# Patient Record
Sex: FEMALE | Race: WHITE | HISPANIC OR LATINO | Employment: UNEMPLOYED | URBAN - METROPOLITAN AREA
[De-identification: names, ages, dates, MRNs, and addresses within clinical notes are randomized per-mention and may not be internally consistent; named-entity substitution may affect disease eponyms.]

---

## 2022-08-04 ENCOUNTER — APPOINTMENT (OUTPATIENT)
Dept: RADIOLOGY | Facility: MEDICAL CENTER | Age: 62
DRG: 418 | End: 2022-08-04
Attending: EMERGENCY MEDICINE
Payer: MEDICAID

## 2022-08-04 ENCOUNTER — HOSPITAL ENCOUNTER (INPATIENT)
Facility: MEDICAL CENTER | Age: 62
LOS: 4 days | DRG: 418 | End: 2022-08-08
Attending: EMERGENCY MEDICINE | Admitting: INTERNAL MEDICINE
Payer: MEDICAID

## 2022-08-04 DIAGNOSIS — D72.829 LEUKOCYTOSIS, UNSPECIFIED TYPE: ICD-10-CM

## 2022-08-04 DIAGNOSIS — R17 SERUM TOTAL BILIRUBIN ELEVATED: ICD-10-CM

## 2022-08-04 DIAGNOSIS — R10.13 EPIGASTRIC PAIN: ICD-10-CM

## 2022-08-04 DIAGNOSIS — R79.89 ELEVATED LFTS: ICD-10-CM

## 2022-08-04 PROBLEM — K83.1 BILE OBSTRUCTION: Status: ACTIVE | Noted: 2022-08-04

## 2022-08-04 PROBLEM — R74.8 ELEVATED LIVER ENZYMES: Status: ACTIVE | Noted: 2022-08-04

## 2022-08-04 LAB
ALBUMIN SERPL BCP-MCNC: 4.2 G/DL (ref 3.2–4.9)
ALBUMIN/GLOB SERPL: 1.3 G/DL
ALP SERPL-CCNC: 352 U/L (ref 30–99)
ALT SERPL-CCNC: 758 U/L (ref 2–50)
ANION GAP SERPL CALC-SCNC: 16 MMOL/L (ref 7–16)
APPEARANCE UR: CLEAR
AST SERPL-CCNC: 640 U/L (ref 12–45)
BACTERIA #/AREA URNS HPF: NEGATIVE /HPF
BASOPHILS # BLD AUTO: 0.2 % (ref 0–1.8)
BASOPHILS # BLD: 0.02 K/UL (ref 0–0.12)
BILIRUB SERPL-MCNC: 6.4 MG/DL (ref 0.1–1.5)
BILIRUB UR QL STRIP.AUTO: ABNORMAL
BUN SERPL-MCNC: 18 MG/DL (ref 8–22)
CALCIUM SERPL-MCNC: 9.2 MG/DL (ref 8.5–10.5)
CHLORIDE SERPL-SCNC: 102 MMOL/L (ref 96–112)
CO2 SERPL-SCNC: 18 MMOL/L (ref 20–33)
COLOR UR: ABNORMAL
CREAT SERPL-MCNC: 0.6 MG/DL (ref 0.5–1.4)
EOSINOPHIL # BLD AUTO: 0 K/UL (ref 0–0.51)
EOSINOPHIL NFR BLD: 0 % (ref 0–6.9)
EPI CELLS #/AREA URNS HPF: ABNORMAL /HPF
ERYTHROCYTE [DISTWIDTH] IN BLOOD BY AUTOMATED COUNT: 46 FL (ref 35.9–50)
GFR SERPLBLD CREATININE-BSD FMLA CKD-EPI: 100 ML/MIN/1.73 M 2
GLOBULIN SER CALC-MCNC: 3.2 G/DL (ref 1.9–3.5)
GLUCOSE SERPL-MCNC: 146 MG/DL (ref 65–99)
GLUCOSE UR STRIP.AUTO-MCNC: NEGATIVE MG/DL
HAV IGM SERPL QL IA: NORMAL
HBV CORE IGM SER QL: NORMAL
HBV SURFACE AG SER QL: NORMAL
HCT VFR BLD AUTO: 42.9 % (ref 37–47)
HCV AB SER QL: NORMAL
HGB BLD-MCNC: 14.5 G/DL (ref 12–16)
IMM GRANULOCYTES # BLD AUTO: 0.07 K/UL (ref 0–0.11)
IMM GRANULOCYTES NFR BLD AUTO: 0.6 % (ref 0–0.9)
KETONES UR STRIP.AUTO-MCNC: 80 MG/DL
LEUKOCYTE ESTERASE UR QL STRIP.AUTO: ABNORMAL
LIPASE SERPL-CCNC: 33 U/L (ref 11–82)
LYMPHOCYTES # BLD AUTO: 0.41 K/UL (ref 1–4.8)
LYMPHOCYTES NFR BLD: 3.4 % (ref 22–41)
MCH RBC QN AUTO: 29.7 PG (ref 27–33)
MCHC RBC AUTO-ENTMCNC: 33.8 G/DL (ref 33.6–35)
MCV RBC AUTO: 87.7 FL (ref 81.4–97.8)
MICRO URNS: ABNORMAL
MONOCYTES # BLD AUTO: 0.33 K/UL (ref 0–0.85)
MONOCYTES NFR BLD AUTO: 2.7 % (ref 0–13.4)
NEUTROPHILS # BLD AUTO: 11.2 K/UL (ref 2–7.15)
NEUTROPHILS NFR BLD: 93.1 % (ref 44–72)
NITRITE UR QL STRIP.AUTO: NEGATIVE
NRBC # BLD AUTO: 0 K/UL
NRBC BLD-RTO: 0 /100 WBC
PH UR STRIP.AUTO: 6.5 [PH] (ref 5–8)
PLATELET # BLD AUTO: 263 K/UL (ref 164–446)
PMV BLD AUTO: 9.3 FL (ref 9–12.9)
POTASSIUM SERPL-SCNC: 3.9 MMOL/L (ref 3.6–5.5)
PROT SERPL-MCNC: 7.4 G/DL (ref 6–8.2)
PROT UR QL STRIP: 30 MG/DL
RBC # BLD AUTO: 4.89 M/UL (ref 4.2–5.4)
RBC # URNS HPF: ABNORMAL /HPF
RBC UR QL AUTO: ABNORMAL
SODIUM SERPL-SCNC: 136 MMOL/L (ref 135–145)
SP GR UR STRIP.AUTO: 1.02
UROBILINOGEN UR STRIP.AUTO-MCNC: 4 MG/DL
WBC # BLD AUTO: 12 K/UL (ref 4.8–10.8)
WBC #/AREA URNS HPF: ABNORMAL /HPF

## 2022-08-04 PROCEDURE — 80074 ACUTE HEPATITIS PANEL: CPT

## 2022-08-04 PROCEDURE — 76705 ECHO EXAM OF ABDOMEN: CPT

## 2022-08-04 PROCEDURE — 96365 THER/PROPH/DIAG IV INF INIT: CPT

## 2022-08-04 PROCEDURE — 99285 EMERGENCY DEPT VISIT HI MDM: CPT

## 2022-08-04 PROCEDURE — 96375 TX/PRO/DX INJ NEW DRUG ADDON: CPT

## 2022-08-04 PROCEDURE — 700102 HCHG RX REV CODE 250 W/ 637 OVERRIDE(OP): Performed by: INTERNAL MEDICINE

## 2022-08-04 PROCEDURE — 80053 COMPREHEN METABOLIC PANEL: CPT

## 2022-08-04 PROCEDURE — 86038 ANTINUCLEAR ANTIBODIES: CPT

## 2022-08-04 PROCEDURE — 700111 HCHG RX REV CODE 636 W/ 250 OVERRIDE (IP): Performed by: INTERNAL MEDICINE

## 2022-08-04 PROCEDURE — 700105 HCHG RX REV CODE 258: Performed by: INTERNAL MEDICINE

## 2022-08-04 PROCEDURE — 81001 URINALYSIS AUTO W/SCOPE: CPT

## 2022-08-04 PROCEDURE — 770001 HCHG ROOM/CARE - MED/SURG/GYN PRIV*

## 2022-08-04 PROCEDURE — 96372 THER/PROPH/DIAG INJ SC/IM: CPT

## 2022-08-04 PROCEDURE — A9270 NON-COVERED ITEM OR SERVICE: HCPCS | Performed by: INTERNAL MEDICINE

## 2022-08-04 PROCEDURE — 700111 HCHG RX REV CODE 636 W/ 250 OVERRIDE (IP): Performed by: EMERGENCY MEDICINE

## 2022-08-04 PROCEDURE — 36415 COLL VENOUS BLD VENIPUNCTURE: CPT

## 2022-08-04 PROCEDURE — 85025 COMPLETE CBC W/AUTO DIFF WBC: CPT

## 2022-08-04 PROCEDURE — 83690 ASSAY OF LIPASE: CPT

## 2022-08-04 PROCEDURE — 99223 1ST HOSP IP/OBS HIGH 75: CPT | Performed by: INTERNAL MEDICINE

## 2022-08-04 PROCEDURE — 700101 HCHG RX REV CODE 250: Performed by: INTERNAL MEDICINE

## 2022-08-04 PROCEDURE — 700101 HCHG RX REV CODE 250: Performed by: EMERGENCY MEDICINE

## 2022-08-04 RX ORDER — ONDANSETRON 4 MG/1
4 TABLET, ORALLY DISINTEGRATING ORAL EVERY 4 HOURS PRN
Status: DISCONTINUED | OUTPATIENT
Start: 2022-08-04 | End: 2022-08-08 | Stop reason: HOSPADM

## 2022-08-04 RX ORDER — METRONIDAZOLE 500 MG/100ML
500 INJECTION, SOLUTION INTRAVENOUS EVERY 6 HOURS
Status: COMPLETED | OUTPATIENT
Start: 2022-08-04 | End: 2022-08-04

## 2022-08-04 RX ORDER — SODIUM CHLORIDE 9 MG/ML
INJECTION, SOLUTION INTRAVENOUS CONTINUOUS
Status: DISCONTINUED | OUTPATIENT
Start: 2022-08-04 | End: 2022-08-06

## 2022-08-04 RX ORDER — PROMETHAZINE HYDROCHLORIDE 12.5 MG/1
12.5-25 SUPPOSITORY RECTAL EVERY 4 HOURS PRN
Status: DISCONTINUED | OUTPATIENT
Start: 2022-08-04 | End: 2022-08-08 | Stop reason: HOSPADM

## 2022-08-04 RX ORDER — IBUPROFEN 200 MG
400 TABLET ORAL EVERY 6 HOURS PRN
Status: ON HOLD | COMMUNITY
End: 2022-08-08

## 2022-08-04 RX ORDER — PROMETHAZINE HYDROCHLORIDE 25 MG/1
12.5-25 TABLET ORAL EVERY 4 HOURS PRN
Status: DISCONTINUED | OUTPATIENT
Start: 2022-08-04 | End: 2022-08-08 | Stop reason: HOSPADM

## 2022-08-04 RX ORDER — AMOXICILLIN 250 MG
2 CAPSULE ORAL 2 TIMES DAILY
Status: DISCONTINUED | OUTPATIENT
Start: 2022-08-05 | End: 2022-08-08 | Stop reason: HOSPADM

## 2022-08-04 RX ORDER — ACETAMINOPHEN 325 MG/1
650 TABLET ORAL EVERY 6 HOURS PRN
Status: DISCONTINUED | OUTPATIENT
Start: 2022-08-04 | End: 2022-08-08 | Stop reason: HOSPADM

## 2022-08-04 RX ORDER — CEFTRIAXONE 1 G/1
1 INJECTION, POWDER, FOR SOLUTION INTRAMUSCULAR; INTRAVENOUS ONCE
Status: COMPLETED | OUTPATIENT
Start: 2022-08-04 | End: 2022-08-04

## 2022-08-04 RX ORDER — POLYETHYLENE GLYCOL 3350 17 G/17G
1 POWDER, FOR SOLUTION ORAL
Status: DISCONTINUED | OUTPATIENT
Start: 2022-08-04 | End: 2022-08-08 | Stop reason: HOSPADM

## 2022-08-04 RX ORDER — BISACODYL 10 MG
10 SUPPOSITORY, RECTAL RECTAL
Status: DISCONTINUED | OUTPATIENT
Start: 2022-08-04 | End: 2022-08-08 | Stop reason: HOSPADM

## 2022-08-04 RX ORDER — ONDANSETRON 2 MG/ML
4 INJECTION INTRAMUSCULAR; INTRAVENOUS EVERY 4 HOURS PRN
Status: DISCONTINUED | OUTPATIENT
Start: 2022-08-04 | End: 2022-08-08 | Stop reason: HOSPADM

## 2022-08-04 RX ORDER — METRONIDAZOLE 500 MG/100ML
500 INJECTION, SOLUTION INTRAVENOUS EVERY 8 HOURS
Status: DISCONTINUED | OUTPATIENT
Start: 2022-08-04 | End: 2022-08-08 | Stop reason: HOSPADM

## 2022-08-04 RX ORDER — PROCHLORPERAZINE EDISYLATE 5 MG/ML
5-10 INJECTION INTRAMUSCULAR; INTRAVENOUS EVERY 4 HOURS PRN
Status: DISCONTINUED | OUTPATIENT
Start: 2022-08-04 | End: 2022-08-08 | Stop reason: HOSPADM

## 2022-08-04 RX ORDER — HEPARIN SODIUM 5000 [USP'U]/ML
5000 INJECTION, SOLUTION INTRAVENOUS; SUBCUTANEOUS EVERY 8 HOURS
Status: DISCONTINUED | OUTPATIENT
Start: 2022-08-04 | End: 2022-08-08 | Stop reason: HOSPADM

## 2022-08-04 RX ADMIN — CEFTRIAXONE SODIUM 1 G: 1 INJECTION, POWDER, FOR SOLUTION INTRAMUSCULAR; INTRAVENOUS at 12:01

## 2022-08-04 RX ADMIN — SODIUM CHLORIDE: 9 INJECTION, SOLUTION INTRAVENOUS at 21:50

## 2022-08-04 RX ADMIN — ACETAMINOPHEN 650 MG: 325 TABLET, FILM COATED ORAL at 20:27

## 2022-08-04 RX ADMIN — METRONIDAZOLE 500 MG: 5 INJECTION, SOLUTION INTRAVENOUS at 12:01

## 2022-08-04 RX ADMIN — HEPARIN SODIUM 5000 UNITS: 5000 INJECTION, SOLUTION INTRAVENOUS; SUBCUTANEOUS at 14:00

## 2022-08-04 RX ADMIN — METRONIDAZOLE 500 MG: 5 INJECTION, SOLUTION INTRAVENOUS at 21:49

## 2022-08-04 RX ADMIN — SODIUM CHLORIDE: 9 INJECTION, SOLUTION INTRAVENOUS at 12:30

## 2022-08-04 ASSESSMENT — COGNITIVE AND FUNCTIONAL STATUS - GENERAL
DAILY ACTIVITIY SCORE: 24
SUGGESTED CMS G CODE MODIFIER DAILY ACTIVITY: CH

## 2022-08-04 ASSESSMENT — LIFESTYLE VARIABLES: ALCOHOL_USE: NO

## 2022-08-04 ASSESSMENT — ENCOUNTER SYMPTOMS
FEVER: 1
NEUROLOGICAL NEGATIVE: 1
MUSCULOSKELETAL NEGATIVE: 1
DIARRHEA: 0
CARDIOVASCULAR NEGATIVE: 1
PSYCHIATRIC NEGATIVE: 1
ABDOMINAL PAIN: 1
VOMITING: 1
CONSTIPATION: 0
NAUSEA: 1
CHILLS: 1
BLOOD IN STOOL: 0
RESPIRATORY NEGATIVE: 1

## 2022-08-04 ASSESSMENT — PATIENT HEALTH QUESTIONNAIRE - PHQ9
SUM OF ALL RESPONSES TO PHQ9 QUESTIONS 1 AND 2: 0
1. LITTLE INTEREST OR PLEASURE IN DOING THINGS: NOT AT ALL
2. FEELING DOWN, DEPRESSED, IRRITABLE, OR HOPELESS: NOT AT ALL

## 2022-08-04 NOTE — ED NOTES
Pharmacy Medication Reconciliation      ~Medication reconciliation updated and complete per patient at bedside with use of  Ayna ID#[id number]  ~Allergies have been verified  ~No oral ABX within the last 30 days

## 2022-08-04 NOTE — ED NOTES
MRI screening completed with pt via . Pt medicated according to MAR. Blood work collected and sent to lab

## 2022-08-04 NOTE — ED PROVIDER NOTES
ED Provider Note    Scribed for Dany Jaeger M.D. by Montse Singh. 8/4/2022  10:29 AM    Primary care provider: None noted  Means of arrival: walk in  History obtained from: patient  History limited by: none    CHIEF COMPLAINT  Chief Complaint   Patient presents with    Sent by MD     Sent from clinic for back and abdominal pain    Abdominal Pain     Pt reports abdominal pain with eating x2 months    Back Pain     X2 months       HPI  Sima Plaza is a 62 y.o. female who presents to the Emergency Department for intermittent mild upper abdominal pain onset 2 months ago. She notes her pain radiates to her mid back. She states that her pain is exacerbated with eating and alleviated without eating. She has associated symptoms of vomiting. She denies any diarrhea or fever. She has no history of abdominal surgery.    REVIEW OF SYSTEMS  Pertinent positives include vomiting.   Pertinent negatives include no diarrhea.    All other systems reviewed and negative. See HPI for further details.       PAST MEDICAL HISTORY   None noted    SURGICAL HISTORY  patient denies any surgical history    SOCIAL HISTORY  Social History     Tobacco Use    Smoking status: Never Smoker    Smokeless tobacco: Never Used   Vaping Use    Vaping Use: Never used   Substance Use Topics    Alcohol use: Never    Drug use: Never      Social History     Substance and Sexual Activity   Drug Use Never       FAMILY HISTORY  History reviewed. No pertinent family history.    CURRENT MEDICATIONS  Home Medications       Reviewed by Beulah Cueva (Pharmacy Tech) on 08/04/22 at 1150  Med List Status: Complete     Medication Last Dose Status   ibuprofen (MOTRIN) 200 MG Tab 8/3/2022 Active   NON SPECIFIED 8/4/2022 Active                    ALLERGIES  No Known Allergies    PHYSICAL EXAM  VITAL SIGNS: /71   Pulse 94   Temp 36.9 °C (98.5 °F) (Temporal)   Resp 18   Ht 1.524 m (5')   Wt 54.8 kg (120 lb 13 oz)   SpO2 95%   BMI 23.59 kg/m²     Nursing  note and vitals reviewed.  Constitutional: Well-developed and well-nourished. No distress.   HENT: Head is normocephalic and atraumatic. Oropharynx is clear and moist without exudate or erythema.   Eyes: Pupils are equal, round, and reactive to light. Conjunctiva are normal.   Cardiovascular: Normal rate and regular rhythm. No murmur heard. Normal radial pulses.  Pulmonary/Chest: Breath sounds normal. No wheezes or rales.   Abdominal: Mild tenderness in the epigastric region. Soft. No distention    Musculoskeletal: Extremities exhibit normal range of motion without edema or tenderness.   Neurological: Awake, alert and oriented to person, place, and time. No focal deficits noted.  Skin: Skin is warm and dry. No rash.   Psychiatric: Normal mood and affect. Appropriate for clinical situation.    DIAGNOSTIC STUDIES / PROCEDURES    LABS  Results for orders placed or performed during the hospital encounter of 08/04/22   CBC with Differential   Result Value Ref Range    WBC 12.0 (H) 4.8 - 10.8 K/uL    RBC 4.89 4.20 - 5.40 M/uL    Hemoglobin 14.5 12.0 - 16.0 g/dL    Hematocrit 42.9 37.0 - 47.0 %    MCV 87.7 81.4 - 97.8 fL    MCH 29.7 27.0 - 33.0 pg    MCHC 33.8 33.6 - 35.0 g/dL    RDW 46.0 35.9 - 50.0 fL    Platelet Count 263 164 - 446 K/uL    MPV 9.3 9.0 - 12.9 fL    Neutrophils-Polys 93.10 (H) 44.00 - 72.00 %    Lymphocytes 3.40 (L) 22.00 - 41.00 %    Monocytes 2.70 0.00 - 13.40 %    Eosinophils 0.00 0.00 - 6.90 %    Basophils 0.20 0.00 - 1.80 %    Immature Granulocytes 0.60 0.00 - 0.90 %    Nucleated RBC 0.00 /100 WBC    Neutrophils (Absolute) 11.20 (H) 2.00 - 7.15 K/uL    Lymphs (Absolute) 0.41 (L) 1.00 - 4.80 K/uL    Monos (Absolute) 0.33 0.00 - 0.85 K/uL    Eos (Absolute) 0.00 0.00 - 0.51 K/uL    Baso (Absolute) 0.02 0.00 - 0.12 K/uL    Immature Granulocytes (abs) 0.07 0.00 - 0.11 K/uL    NRBC (Absolute) 0.00 K/uL   Complete Metabolic Panel   Result Value Ref Range    Sodium 136 135 - 145 mmol/L    Potassium 3.9 3.6 -  5.5 mmol/L    Chloride 102 96 - 112 mmol/L    Co2 18 (L) 20 - 33 mmol/L    Anion Gap 16.0 7.0 - 16.0    Glucose 146 (H) 65 - 99 mg/dL    Bun 18 8 - 22 mg/dL    Creatinine 0.60 0.50 - 1.40 mg/dL    Calcium 9.2 8.5 - 10.5 mg/dL    AST(SGOT) 640 (H) 12 - 45 U/L    ALT(SGPT) 758 (H) 2 - 50 U/L    Alkaline Phosphatase 352 (H) 30 - 99 U/L    Total Bilirubin 6.4 (H) 0.1 - 1.5 mg/dL    Albumin 4.2 3.2 - 4.9 g/dL    Total Protein 7.4 6.0 - 8.2 g/dL    Globulin 3.2 1.9 - 3.5 g/dL    A-G Ratio 1.3 g/dL   Lipase   Result Value Ref Range    Lipase 33 11 - 82 U/L   Urinalysis    Specimen: Urine   Result Value Ref Range    Micro Urine Req Microscopic    ESTIMATED GFR   Result Value Ref Range    GFR (CKD-EPI) 100 >60 mL/min/1.73 m 2       All labs reviewed by me.    RADIOLOGY  US-RUQ   Final Result      1.  Gallbladder wall thickening, potentially in part due to partially contracted state although cholecystitis is not excluded.  No gallstone demonstrated.   2.  No biliary dilation.   3.  Small RIGHT kidney cyst for which no further evaluation is necessary.      [id number]/O    (Results Pending)     The radiologist's interpretation of all radiological studies have been reviewed by me.    COURSE & MEDICAL DECISION MAKING  Nursing notes, VS, PMSFHx reviewed in chart.     10:29 AM - Patient seen and examined at bedside. Ordered US-RUQ, CBC w/ diff, CMP, lipase and UA to evaluate her symptoms. The differential diagnoses include but are not limited to: cholelithiasis or cholecystitis.     11:26 AM - Paged hospitalist.    11:46 AM - I discussed the patient's case and the above findings with Dr. Butler (Hospitalist) who agrees to accept her for hospitalization.    Patient presents today with symptoms most consistent with biliary colic.  AST, ALT, alkaline phosphatase, and total bilirubin are elevated.  There is no evidence of biliary dilatation.  She will be admitted to the hospital for further evaluation likely MRCP.  I have initiated  antibiotic therapy given possibility of cholecystitis.    DISPOSITION:  Patient will be hospitalized by Dr. Butler in guarded condition.      FINAL IMPRESSION  1. Epigastric pain    2. Elevated LFTs    3. Serum total bilirubin elevated    4. Leukocytosis, unspecified type          I, Montse Singh (Scribe), am scribing for, and in the presence of, Dany Jaeger M.D..    Electronically signed by: Montse Singh (Scribe), 8/4/2022    IDany M.D. personally performed the services described in this documentation, as scribed by Montse Singh in my presence, and it is both accurate and complete.    The note accurately reflects work and decisions made by me.  Dany Jaeger M.D.  8/4/2022  2:41 PM

## 2022-08-04 NOTE — ED TRIAGE NOTES
Chief Complaint   Patient presents with   • Sent by MD     Sent from clinic for back and abdominal pain   • Abdominal Pain     Pt reports abdominal pain with eating x2 months   • Back Pain     X2 months     Pt ambulatory to triage with above complaint. Pt in NAD. VSS.

## 2022-08-04 NOTE — CONSULTS
Gastroenterology Consult Note     Date of Consult: 8/4/22  Referring Physician: Dr. Butler     Reason for consult:  Elevated LFTs, abdominal pain        HPI:  Emirati translation provided by [employer] employee.  Patient reports 1 week of post-prandial epigastric pain, severe, associated with non-blood emesis.  She reports shaking chills, last experienced early this AM.  W/U has included elevated LFTs with T. Bili 6.4, , , Alk phos 352, WBC 12, Lipase 33.  U/S with normal contoured liver, question of cholecystitis with GB wall thickening, and CBD not dilated at 4.9mm.  Patient denies any previous history of liver disease, and does not consume alcohol.  She denies any use of tylenol, or any natural herbs/vitamins.  Her father had what sounds like Etoh cirrhosis.       PMHX:History reviewed. No pertinent past medical history.       PSurgHx: Appendectomy   ALLERGIES:Patient has no known allergies.     SocHx:   Social History     Socioeconomic History   • Marital status: Single     Spouse name: Not on file   • Number of children: Not on file   • Years of education: Not on file   • Highest education level: Not on file   Occupational History   • Not on file   Tobacco Use   • Smoking status: Never Smoker   • Smokeless tobacco: Never Used   Vaping Use   • Vaping Use: Never used   Substance and Sexual Activity   • Alcohol use: Never   • Drug use: Never   • Sexual activity: Not on file   Other Topics Concern   • Not on file   Social History Narrative   • Not on file     Social Determinants of Health     Financial Resource Strain: Not on file   Food Insecurity: Not on file   Transportation Needs: Not on file   Physical Activity: Not on file   Stress: Not on file   Social Connections: Not on file   Intimate Partner Violence: Not on file   Housing Stability: Not on file        FAMHx: History reviewed. No pertinent family history.     ROS:  Constitutional: +chills, no night  sweats, no weight changes  HEENT: no vision or hearing changes, no dry mouth, no change in smell  CARDIO: no palpitations, no orthopnea, no chest pain  PULM: no cough, no shortness of breath  NEURO: no Seizures, no memory impairment, no change in sensation  GI: as above  : no dysuria, no hematuria  HEME: no anemia, no easy brusing  MUSCULOSKELETAL: no muscle aches, no back pain, no arthritis  PSYCH: no anxiety or depression  SKIN: no rashes     PE:  Vitals:    08/04/22 1300 08/04/22 1439 08/04/22 1521 08/04/22 1610   BP: 134/62 123/58 132/60 121/61   Pulse: 72 75 79 74   Resp:    18   Temp:    36.4 °C (97.6 °F)   TempSrc:    Temporal   SpO2: 94% 94% 93% 97%   Weight:       Height:         Gen: AAOx3, NAD, lying in bed  HEENT: PERRL, EOMI, nares patent, Mucous membranes moist  Neck: supple, no cervical or supraclavicular adenopathy  CVS: regular rhythm, normal rate, no MRG  Pulm: CTAB, no crackles  Abd: soft, Nd, NT, no guarding or rebound  Ext: no edema, normal sensation  NEURO: grossly normal, no weakness  Skin: warm, no rash  Psych: normal Affect, no anxiety     LABS:  Lab Results   Component Value Date/Time    SODIUM 136 08/04/2022 09:59 AM    POTASSIUM 3.9 08/04/2022 09:59 AM    CHLORIDE 102 08/04/2022 09:59 AM    CO2 18 (L) 08/04/2022 09:59 AM    GLUCOSE 146 (H) 08/04/2022 09:59 AM    BUN 18 08/04/2022 09:59 AM    CREATININE 0.60 08/04/2022 09:59 AM      Lab Results   Component Value Date/Time    WBC 12.0 (H) 08/04/2022 09:59 AM    RBC 4.89 08/04/2022 09:59 AM    HEMOGLOBIN 14.5 08/04/2022 09:59 AM    HEMATOCRIT 42.9 08/04/2022 09:59 AM    MCV 87.7 08/04/2022 09:59 AM    MCH 29.7 08/04/2022 09:59 AM    MCHC 33.8 08/04/2022 09:59 AM    MPV 9.3 08/04/2022 09:59 AM    NEUTSPOLYS 93.10 (H) 08/04/2022 09:59 AM    LYMPHOCYTES 3.40 (L) 08/04/2022 09:59 AM    MONOCYTES 2.70 08/04/2022 09:59 AM    EOSINOPHILS 0.00 08/04/2022 09:59 AM    BASOPHILS 0.20 08/04/2022 09:59 AM        No results found for: PROTHROMBTM, INR    Recent Labs     08/04/22  0959   ASTSGOT 640*   ALTSGPT 758*   TBILIRUBIN 6.4*   GLOBULIN 3.2          Problem List Items Addressed This Visit    None     Visit Diagnoses     Epigastric pain        Elevated LFTs        Serum total bilirubin elevated        Leukocytosis, unspecified type               ASSESSMENT:   1. Elevated LFTs, most c/w obstructive etiology.  Question passed vs persstent biliary stone  2. Fever/chills as outpatient     PLAN:   1. Obtain MRCP, and follow LFTs  to rule out CBD stone.  2. Agree with empiric antibiotic coverage given hx of chills.  3. Proceed with ERCP tomorrow if stone present.  Will make NPO after MN in preparation.  4. Discussed with patient, all questions answered.  She is agreeable to proceed with the plan as above.    Thank you for this consult.     Tavo Domingo MD

## 2022-08-04 NOTE — H&P
Hospital Medicine History & Physical Note    Date of Service  8/4/2022    Primary Care Physician  No primary care provider on file.    Consultants  GI    Code Status  Full Code    Chief Complaint  Chief Complaint   Patient presents with   • Sent by MD     Sent from clinic for back and abdominal pain   • Abdominal Pain     Pt reports abdominal pain with eating x2 months   • Back Pain     X2 months       History of Presenting Illness    62-year-old female with no significant past medical history presented 8/4 with abdominal pain.  History was taken using .  Patient has had epigastric pain for more than 2 months, comes and goes, increased with food, associated with nausea vomiting, patient has low-grade fever and chills.  Denied any chest pain or shortness of breath, on admission labs showed mildly leukocytosis 12 with elevated liver enzymes  and  with elevated bilirubin 6.4.  Ultrasound showed: Bladder wall thickening, cholecystitis was not excluded, no stones and no bile duct dilation.  IV fluid with IV ceftriaxone and Flagyl were started, GI was consulted.    I discussed the plan of care with patient, bedside RN and GI.    Review of Systems  Review of Systems   Constitutional: Positive for chills and fever.   HENT: Negative.    Respiratory: Negative.    Cardiovascular: Negative.    Gastrointestinal: Positive for abdominal pain, nausea and vomiting. Negative for blood in stool, constipation and diarrhea.   Genitourinary: Negative.    Musculoskeletal: Negative.    Skin: Negative.    Neurological: Negative.    Psychiatric/Behavioral: Negative.        Past Medical History  Patient denied any past medical history    Surgical History  No past surgical history    Family History  Diabetes with her diet.  Family history reviewed with patient. There is no family history that is pertinent to the chief complaint.     Social History   reports that she has never smoked. She has never used smokeless  tobacco. She reports that she does not drink alcohol and does not use drugs.    Allergies  No Known Allergies    Medications  Prior to Admission Medications   Prescriptions Last Dose Informant Patient Reported? Taking?   NON SPECIFIED 8/4/2022 at 0100 Patient Yes Yes   Sig: Take 1 Tablet by mouth one time. Unknown stomach pill   ibuprofen (MOTRIN) 200 MG Tab 8/3/2022 at PM Patient Yes Yes   Sig: Take 400 mg by mouth every 6 hours as needed for Mild Pain.      Facility-Administered Medications: None       Physical Exam  Temp:  [36.4 °C (97.6 °F)-36.9 °C (98.5 °F)] 36.4 °C (97.6 °F)  Pulse:  [72-94] 74  Resp:  [18] 18  BP: (121-134)/(58-71) 121/61  SpO2:  [93 %-97 %] 97 %  Blood Pressure: 132/71   Temperature: 36.9 °C (98.5 °F)   Pulse: 73   Respiration: 18   Pulse Oximetry: 95 %       Physical Exam  Constitutional:       General: She is not in acute distress.     Appearance: She is not ill-appearing.   HENT:      Head: Normocephalic and atraumatic.   Eyes:      General: No scleral icterus.  Cardiovascular:      Rate and Rhythm: Normal rate.      Heart sounds: No murmur heard.  Pulmonary:      Effort: No respiratory distress.      Breath sounds: No wheezing or rales.   Abdominal:      General: There is no distension.      Palpations: Abdomen is soft. There is no mass.      Tenderness: There is abdominal tenderness. There is no guarding.   Skin:     Coloration: Skin is not jaundiced.      Findings: No bruising, erythema, lesion or rash.   Neurological:      General: No focal deficit present.      Mental Status: She is alert and oriented to person, place, and time. Mental status is at baseline.      Cranial Nerves: No cranial nerve deficit.      Motor: No weakness.         Laboratory:  Recent Labs     08/04/22  0959   WBC 12.0*   RBC 4.89   HEMOGLOBIN 14.5   HEMATOCRIT 42.9   MCV 87.7   MCH 29.7   MCHC 33.8   RDW 46.0   PLATELETCT 263   MPV 9.3     Recent Labs     08/04/22  0959   SODIUM 136   POTASSIUM 3.9   CHLORIDE  102   CO2 18*   GLUCOSE 146*   BUN 18   CREATININE 0.60   CALCIUM 9.2     Recent Labs     08/04/22  0959   ALTSGPT 758*   ASTSGOT 640*   ALKPHOSPHAT 352*   TBILIRUBIN 6.4*   LIPASE 33   GLUCOSE 146*         No results for input(s): NTPROBNP in the last 72 hours.      No results for input(s): TROPONINT in the last 72 hours.    Imaging:  US-RUQ   Final Result      1.  Gallbladder wall thickening, potentially in part due to partially contracted state although cholecystitis is not excluded.  No gallstone demonstrated.   2.  No biliary dilation.   3.  Small RIGHT kidney cyst for which no further evaluation is necessary.      [id number]-T/O    (Results Pending)   [id number]-V/O    (Results Pending)       X-Ray:  I have personally reviewed the images and compared with prior images.  EKG:  I have personally reviewed the images and compared with prior images.    Assessment/Plan:  Justification for Admission Status  I anticipate this patient will require at least two midnights for appropriate medical management, necessitating inpatient admission because Cholangitis//    Patient will need a Med/Surg bed on MEDICAL service .  The need is secondary to cholangitis.    * Bile obstruction- (present on admission)  Assessment & Plan  Possible cholecystitis/cholangitis  Came with abdominal pain, nausea vomiting  Leukocytosis and elevated liver enzymes and bilirubin  Ultrasound showed possible cholecystitis however no common bile duct dilation  Will order MRCP and continue antibiotic and IV fluid  GI on board, appreciate recommendations  Patient might need surgery consult letter.    Elevated liver enzymes  Assessment & Plan  With elevated bilirubin and abdominal pain  Possible cholangitis versus stones  MRCP and possible ERCP, GI on board  Patient denied history of alcoholism  Viral hepatitis panel is negative    Elevated bilirubin  Assessment & Plan  Possible obstruction stone versus mass  MRCP and ERCP if needed  Patient might need  surgery  Labs daily    Leukocytosis  Assessment & Plan  Possible related to cholangitis and cholecystitis  IV antibiotic ceftriaxone and Flagyl  Labs daily      VTE prophylaxis: SCDs/TEDs and enoxaparin ppx

## 2022-08-05 ENCOUNTER — APPOINTMENT (OUTPATIENT)
Dept: RADIOLOGY | Facility: MEDICAL CENTER | Age: 62
DRG: 418 | End: 2022-08-05
Payer: MEDICAID

## 2022-08-05 LAB
ALBUMIN SERPL BCP-MCNC: 3.6 G/DL (ref 3.2–4.9)
ALBUMIN/GLOB SERPL: 1.2 G/DL
ALP SERPL-CCNC: 284 U/L (ref 30–99)
ALT SERPL-CCNC: 535 U/L (ref 2–50)
ANION GAP SERPL CALC-SCNC: 15 MMOL/L (ref 7–16)
AST SERPL-CCNC: 356 U/L (ref 12–45)
BASOPHILS # BLD AUTO: 0.3 % (ref 0–1.8)
BASOPHILS # BLD: 0.02 K/UL (ref 0–0.12)
BILIRUB SERPL-MCNC: 3.8 MG/DL (ref 0.1–1.5)
BUN SERPL-MCNC: 15 MG/DL (ref 8–22)
CALCIUM SERPL-MCNC: 8.5 MG/DL (ref 8.5–10.5)
CHLORIDE SERPL-SCNC: 107 MMOL/L (ref 96–112)
CO2 SERPL-SCNC: 19 MMOL/L (ref 20–33)
CREAT SERPL-MCNC: 0.7 MG/DL (ref 0.5–1.4)
EOSINOPHIL # BLD AUTO: 0.01 K/UL (ref 0–0.51)
EOSINOPHIL NFR BLD: 0.2 % (ref 0–6.9)
ERYTHROCYTE [DISTWIDTH] IN BLOOD BY AUTOMATED COUNT: 49.4 FL (ref 35.9–50)
GFR SERPLBLD CREATININE-BSD FMLA CKD-EPI: 96 ML/MIN/1.73 M 2
GLOBULIN SER CALC-MCNC: 2.9 G/DL (ref 1.9–3.5)
GLUCOSE SERPL-MCNC: 87 MG/DL (ref 65–99)
HCT VFR BLD AUTO: 39.3 % (ref 37–47)
HGB BLD-MCNC: 12.9 G/DL (ref 12–16)
IMM GRANULOCYTES # BLD AUTO: 0.02 K/UL (ref 0–0.11)
IMM GRANULOCYTES NFR BLD AUTO: 0.3 % (ref 0–0.9)
LYMPHOCYTES # BLD AUTO: 0.58 K/UL (ref 1–4.8)
LYMPHOCYTES NFR BLD: 10.1 % (ref 22–41)
MAGNESIUM SERPL-MCNC: 1.9 MG/DL (ref 1.5–2.5)
MCH RBC QN AUTO: 29.5 PG (ref 27–33)
MCHC RBC AUTO-ENTMCNC: 32.8 G/DL (ref 33.6–35)
MCV RBC AUTO: 89.7 FL (ref 81.4–97.8)
MONOCYTES # BLD AUTO: 0.22 K/UL (ref 0–0.85)
MONOCYTES NFR BLD AUTO: 3.8 % (ref 0–13.4)
NEUTROPHILS # BLD AUTO: 4.92 K/UL (ref 2–7.15)
NEUTROPHILS NFR BLD: 85.3 % (ref 44–72)
NRBC # BLD AUTO: 0 K/UL
NRBC BLD-RTO: 0 /100 WBC
PLATELET # BLD AUTO: 195 K/UL (ref 164–446)
PMV BLD AUTO: 10.1 FL (ref 9–12.9)
POTASSIUM SERPL-SCNC: 3.4 MMOL/L (ref 3.6–5.5)
PROCALCITONIN SERPL-MCNC: 0.73 NG/ML
PROT SERPL-MCNC: 6.5 G/DL (ref 6–8.2)
RBC # BLD AUTO: 4.38 M/UL (ref 4.2–5.4)
SODIUM SERPL-SCNC: 141 MMOL/L (ref 135–145)
WBC # BLD AUTO: 5.8 K/UL (ref 4.8–10.8)

## 2022-08-05 PROCEDURE — 36415 COLL VENOUS BLD VENIPUNCTURE: CPT

## 2022-08-05 PROCEDURE — 83735 ASSAY OF MAGNESIUM: CPT

## 2022-08-05 PROCEDURE — 700105 HCHG RX REV CODE 258: Performed by: INTERNAL MEDICINE

## 2022-08-05 PROCEDURE — 80053 COMPREHEN METABOLIC PANEL: CPT

## 2022-08-05 PROCEDURE — 85025 COMPLETE CBC W/AUTO DIFF WBC: CPT

## 2022-08-05 PROCEDURE — 700111 HCHG RX REV CODE 636 W/ 250 OVERRIDE (IP): Performed by: INTERNAL MEDICINE

## 2022-08-05 PROCEDURE — 84145 PROCALCITONIN (PCT): CPT

## 2022-08-05 PROCEDURE — 74181 MRI ABDOMEN W/O CONTRAST: CPT

## 2022-08-05 PROCEDURE — 770001 HCHG ROOM/CARE - MED/SURG/GYN PRIV*

## 2022-08-05 PROCEDURE — 99233 SBSQ HOSP IP/OBS HIGH 50: CPT | Mod: GC | Performed by: INTERNAL MEDICINE

## 2022-08-05 PROCEDURE — 700101 HCHG RX REV CODE 250: Performed by: INTERNAL MEDICINE

## 2022-08-05 RX ADMIN — METRONIDAZOLE 500 MG: 5 INJECTION, SOLUTION INTRAVENOUS at 22:38

## 2022-08-05 RX ADMIN — SODIUM CHLORIDE: 9 INJECTION, SOLUTION INTRAVENOUS at 13:21

## 2022-08-05 RX ADMIN — METRONIDAZOLE 500 MG: 5 INJECTION, SOLUTION INTRAVENOUS at 05:04

## 2022-08-05 RX ADMIN — METRONIDAZOLE 500 MG: 5 INJECTION, SOLUTION INTRAVENOUS at 13:21

## 2022-08-05 RX ADMIN — CEFTRIAXONE SODIUM 1 G: 10 INJECTION, POWDER, FOR SOLUTION INTRAVENOUS at 05:00

## 2022-08-05 ASSESSMENT — ENCOUNTER SYMPTOMS
HEADACHES: 0
HEADACHES: 1
CHILLS: 0
BACK PAIN: 0
SHORTNESS OF BREATH: 0
CARDIOVASCULAR NEGATIVE: 1
FEVER: 0
WEIGHT LOSS: 0
MYALGIAS: 0
NAUSEA: 0
MUSCULOSKELETAL NEGATIVE: 1
ABDOMINAL PAIN: 0
EYES NEGATIVE: 1
GASTROINTESTINAL NEGATIVE: 1
DIARRHEA: 0
CONSTITUTIONAL NEGATIVE: 1
RESPIRATORY NEGATIVE: 1
COUGH: 0
VOMITING: 0
NEUROLOGICAL NEGATIVE: 1

## 2022-08-05 NOTE — CARE PLAN
The patient is Stable - Low risk of patient condition declining or worsening         Progress made toward(s) clinical / shift goals: Nutrition - pt will follow NPO diet.    Patient is not progressing towards the following goals:

## 2022-08-05 NOTE — PROGRESS NOTES
Alert and oriented x4. Diet reinstructed to be on NPO. Safety precautions in placed. Bed in lowest position. Upper side rails up. Treaded socks on. Reinforced the use of call light when needing assistance.

## 2022-08-05 NOTE — ASSESSMENT & PLAN NOTE
Likely related to choledocholithiasis.  Mild leukocytosis at 12 initially.    -Recent labs demonstrate resolution  -Continue IV ABX ceftriaxone and Flagyl  -Monitor through labs

## 2022-08-05 NOTE — PROGRESS NOTES
[facility] Internal Medicine Daily Progress Note    Date of Service  8/5/2022    R Team: R IM Purple Team   Attending: Caridad Oakley M.d.  Senior Resident: Dr. Weber Lung  Intern:  Dr. Robert Bridges  Contact Number: [phone]    Chief Complaint  Sima Plaza is a 62 y.o. female admitted 8/4/2022 with abdominal pain    Hospital Course  62-year-old female with no significant PMH presenting on 8/4 with abdominal pain.  Has had epigastric pain for over 2 months, aggravated by eating, comes and goes, and associated with N/V.  Also developed low-grade fever and chills, denied cardiorespiratory symptoms.  Admission labs WBC 12, , , 6.4 bilirubin.  U/S demonstrated gallbladder wall thickening, no stones, and no bile duct dilatation.  Started on IV fluids with ceftriaxone and Flagyl.  GI was consulted.      Interval Problem Update  Seen 8/5, feeling much better today.  Patient denies any N/V, or abdominal pain.  She also denies any cardiorespiratory symptoms.  Patient endorsed a headache due to being woken up constantly, and also being however.  Currently awaiting MRCP.  Clinical picture has significantly improved overall, with labs downtrending as well.  Leukocytosis has resolved.    I have discussed this patient's plan of care and discharge plan at IDT rounds today with Case Management, Nursing, Nursing leadership, and other members of the IDT team.    Consultants/Specialty  GI    Code Status  Full Code    Disposition  Patient is not medically cleared for discharge.   Anticipate discharge to to home with close outpatient follow-up.  I have placed the appropriate orders for post-discharge needs.    Review of Systems  Review of Systems   Gastrointestinal: Negative for abdominal pain, nausea and vomiting.   Neurological: Positive for headaches.   All other systems reviewed and are negative.       Physical Exam  Temp:  [36.2 °C (97.2 °F)-39 °C (102.2 °F)] 36.3 °C (97.3 °F)  Pulse:  [62-90] 63  Resp:  [17-18]  17  BP: (108-132)/(58-69) 129/64  SpO2:  [93 %-97 %] 96 %    Physical Exam  Constitutional:       General: She is not in acute distress.     Appearance: Normal appearance.   HENT:      Head: Normocephalic and atraumatic.   Eyes:      Extraocular Movements: Extraocular movements intact.      Conjunctiva/sclera: Conjunctivae normal.      Pupils: Pupils are equal, round, and reactive to light.   Cardiovascular:      Rate and Rhythm: Normal rate and regular rhythm.      Pulses: Normal pulses.      Heart sounds: Normal heart sounds.   Pulmonary:      Effort: Pulmonary effort is normal.      Breath sounds: Normal breath sounds.   Abdominal:      General: Abdomen is flat. Bowel sounds are normal.      Palpations: Abdomen is soft.      Tenderness: There is abdominal tenderness (Mild) in the right lower quadrant and left lower quadrant. There is no guarding or rebound.   Musculoskeletal:      Right lower leg: No edema.      Left lower leg: No edema.   Skin:     General: Skin is warm.      Coloration: Skin is not jaundiced.   Neurological:      General: No focal deficit present.      Mental Status: She is alert and oriented to person, place, and time.   Psychiatric:         Mood and Affect: Mood normal.         Behavior: Behavior normal.         Fluids    Intake/Output Summary (Last 24 hours) at 8/5/2022 1357  Last data filed at 8/5/2022 0500  Gross per 24 hour   Intake 0 ml   Output --   Net 0 ml       Laboratory  Recent Labs     08/04/22  0959 08/05/22  0236   WBC 12.0* 5.8   RBC 4.89 4.38   HEMOGLOBIN 14.5 12.9   HEMATOCRIT 42.9 39.3   MCV 87.7 89.7   MCH 29.7 29.5   MCHC 33.8 32.8*   RDW 46.0 49.4   PLATELETCT 263 195   MPV 9.3 10.1     Recent Labs     08/04/22  0959 08/05/22  0236   SODIUM 136 141   POTASSIUM 3.9 3.4*   CHLORIDE 102 107   CO2 18* 19*   GLUCOSE 146* 87   BUN 18 15   CREATININE 0.60 0.70   CALCIUM 9.2 8.5                   Imaging  US-RUQ   Final Result      1.  Gallbladder wall thickening, potentially in  part due to partially contracted state although cholecystitis is not excluded.  No gallstone demonstrated.   2.  No biliary dilation.   3.  Small RIGHT kidney cyst for which no further evaluation is necessary.      LH-[id number]-W/O    (Results Pending)   TL-QARGIQN-T/O    (Results Pending)        Assessment/Plan  Problem Representation:    * Bile obstruction- (present on admission)  Assessment & Plan  Possible cholecystitis vs. Cholangitis.  Initially presented with ABD pain in associated N/V.  Mild leukocytosis with elevated LFTs, bilirubin and alk phos.  U/S demonstrated gallbladder wall thickening, however no stones or bile duct dilation.    -Awaiting on MRCP, ERCP if imaging remarkable for CBD stone  -Possible surgery if acute symptoms emerge  -currently asymptomatic, possible that stone has passed.    Elevated bilirubin  Assessment & Plan  Likely secondary to obstruction.  Associated elevated alk phos.  Ultrasound did not demonstrate stones or bile duct dilation.    -MRCP, and ERCP if positive for CBD stone  -Possibility of surgery if patient decompensates  -Monitor through labs    Leukocytosis  Assessment & Plan  Likely related to cholangitis VS.cholecystitis.  Mild leukocytosis at 12 initially.    -Continue IV ABX ceftriaxone and Flagyl  -Current WBC 5.8  -We will monitor through labs    Elevated liver enzymes  Assessment & Plan  Possible cholangitis vs.cholelithiasis.  Associated elevated bilirubin and alk phos.  Abdominal pain initially present on admission.  Viral hepatitis panel negative.  No history of alcohol abuse.    -Repeat LFTs downtrending, as well as bilirubin and alk phos  -Awaiting MRCP to rule out stones. Possible ERCP, GI following  -Continuing Flagyl and C3  -Will continue to trend with labs       VTE prophylaxis: heparin ppx    I have performed a physical exam and reviewed and updated ROS and Plan today (8/5/2022). In review of yesterday's note (8/4/2022), there are no changes except as  documented above.

## 2022-08-05 NOTE — HOSPITAL COURSE
62-year-old female with no significant PMH presenting on 8/4 with abdominal pain.  Has had epigastric pain for over 2 months, aggravated by eating, comes and goes, and associated with N/V.  Also developed low-grade fever and chills, denied cardiorespiratory symptoms.  Admission labs WBC 12, , , 6.4 bilirubin.  U/S demonstrated gallbladder wall thickening, no stones, and no bile duct dilatation.  Started on IV fluids with ceftriaxone and Flagyl.  GI was consulted. MRCP positive for CBD stone, with ERCP performed and stent placed.  Elective cholecystectomy performed to prevent future recurrence.

## 2022-08-05 NOTE — ASSESSMENT & PLAN NOTE
Likely secondary to known choledocholithiasis seen on MRCP.  Associated elevated bilirubin and alk phos.  Abdominal pain initially present on admission.  Viral hepatitis panel negative.  No history of alcohol abuse.    -Repeat LFTs downtrending, as well as bilirubin and alk phos  -Continuing Flagyl and C3  -Will continue to trend with labs

## 2022-08-05 NOTE — ASSESSMENT & PLAN NOTE
Likely secondary to obstruction.  Associated elevated alk phos.  Ultrasound did not demonstrate stones or bile duct dilation.  MRCP positive for CBD stone.  ERCP with stent placement on 8/6.    -Recent labs demonstrate resolution  -Patient to receive laparoscopic cholecystectomy today  -Monitor through labs

## 2022-08-05 NOTE — PROGRESS NOTES
Gastroenterology Progress Note     Author: Mert Otero M.D.   Date & Time Created: 8/5/2022 12:32 PM    Chief Complaint:  Epigastric pain    Interval History:  She is pain free at the moment    Review of Systems:  Review of Systems   Constitutional: Negative.    HENT: Negative.    Eyes: Negative.    Respiratory: Negative.    Cardiovascular: Negative.    Gastrointestinal: Negative.    Genitourinary: Negative.    Musculoskeletal: Negative.    Neurological: Negative.        Physical Exam:  Physical Exam  Constitutional:       Appearance: Normal appearance.   HENT:      Head: Normocephalic.      Nose: Nose normal.      Mouth/Throat:      Mouth: Mucous membranes are moist.   Eyes:      Pupils: Pupils are equal, round, and reactive to light.   Cardiovascular:      Rate and Rhythm: Normal rate and regular rhythm.      Pulses: Normal pulses.      Heart sounds: Normal heart sounds.   Pulmonary:      Effort: Pulmonary effort is normal.      Breath sounds: Normal breath sounds.   Abdominal:      General: Abdomen is flat. Bowel sounds are normal.      Palpations: Abdomen is soft.   Neurological:      General: No focal deficit present.      Mental Status: She is alert.         Labs:          Recent Labs     08/04/22  0959 08/05/22  0236   SODIUM 136 141   POTASSIUM 3.9 3.4*   CHLORIDE 102 107   CO2 18* 19*   BUN 18 15   CREATININE 0.60 0.70   MAGNESIUM  --  1.9   CALCIUM 9.2 8.5     Recent Labs     08/04/22  0959 08/05/22  0236   ALTSGPT 758* 535*   ASTSGOT 640* 356*   ALKPHOSPHAT 352* 284*   TBILIRUBIN 6.4* 3.8*   LIPASE 33  --    GLUCOSE 146* 87     Recent Labs     08/04/22  0959 08/05/22  0236   RBC 4.89 4.38   HEMOGLOBIN 14.5 12.9   HEMATOCRIT 42.9 39.3   PLATELETCT 263 195     Recent Labs     08/04/22  0959 08/05/22  0236   WBC 12.0* 5.8   NEUTSPOLYS 93.10* 85.30*   LYMPHOCYTES 3.40* 10.10*   MONOCYTES 2.70 3.80   EOSINOPHILS 0.00 0.20   BASOPHILS 0.20 0.30   ASTSGOT 640* 356*   ALTSGPT 758* 535*   ALKPHOSPHAT 352*  284*   TBILIRUBIN 6.4* 3.8*     Hemodynamics:  Temp (24hrs), Av.8 °C (98.3 °F), Min:36.2 °C (97.2 °F), Max:39 °C (102.2 °F)  Temperature: 36.3 °C (97.3 °F)  Pulse  Av.8  Min: 62  Max: 94   Blood Pressure: 129/64     Respiratory:    Respiration: 17, Pulse Oximetry: 96 %           Fluids:    Intake/Output Summary (Last 24 hours) at 2022 1232  Last data filed at 2022 0500  Gross per 24 hour   Intake 0 ml   Output --   Net 0 ml        GI/Nutrition:  Orders Placed This Encounter   Procedures   • Diet NPO Restrict to: Strict     Standing Status:   Standing     Number of Occurrences:   8     Order Specific Question:   Diet NPO Restrict to:     Answer:   Strict [1]     Medical Decision Making, by Problem:  Active Hospital Problems    Diagnosis    • *Bile obstruction [K83.1]    • Elevated liver enzymes [R74.8]    • Leukocytosis [D72.829]    • Elevated bilirubin [R17]        Plan:  MRCP pending if negative no ercp   lft's improving  Will follow from a far no bile duct dilation on U/S   Call if any issues.  [phone]  Continue supportive care, liquid diet  Quality-Core Measures

## 2022-08-05 NOTE — ASSESSMENT & PLAN NOTE
Initially presented with ABD pain in associated N/V.  Mild leukocytosis with elevated LFTs, bilirubin and alk phos.  U/S demonstrated gallbladder wall thickening, however no stones or bile duct dilation.  MRCP consistent with choledocholithiasis with CBD dilation measuring 10 mm.    -ERCP with stent placement on 8/6  -Patient taken to the OR for laparoscopic cholecystectomy on 8/7 to prevent future recurrence

## 2022-08-05 NOTE — NON-PROVIDER
Daily Progress Note:     Date of Service: 8/5/2022  Primary Team: [facility] IM Purple Team   Attending: Caridad Oakley M.D.   Senior Resident: Dr. Weber Lung  Intern: Dr. Robert Bridges  Contact: [phone]    HPI:   Sima is a 62 y.o. female with no significant past medical history who presented 8/4/2022 with 2 month history of intermittent RUQ/epigastric abdominal and back pain. She describes cramping abdominal pain in RUQ/ epigastric region typically after meals which resolve after a few hours. She also reports fevers and chills at home yesterday which prompted her to go to the ED. Initial workup revealed no leukocytosis, elevated LFTs, ALP, bili, procal as well as U/S evidence of gallbladder wall thickening with no biliary duct dilation.     Subjective:   Today, patient states that she is feeling much better. She reports no RUQ or epigastric pain. In the morning, she initially reported minimal/mild LLQ pain that has now resolved. She has not had a bowel movement in 3 days, which is typical for her. Patient states that she is hungry and she would like to go home but it was explained that she is currently NPO and that we need to further evaluate her current condition prior to discharge.     Consultants/Specialty:  GI     Code Status:  Full     Review of Systems:    Review of Systems   Constitutional: Negative for chills, fever, malaise/fatigue and weight loss.   Respiratory: Negative for cough and shortness of breath.    Cardiovascular: Negative for chest pain.   Gastrointestinal: Negative for abdominal pain and diarrhea.   Musculoskeletal: Negative for back pain and myalgias.   Neurological: Negative for headaches.     Objective Data:   Physical Exam:   Vitals:   Temp:  [36.1 °C (96.9 °F)-39 °C (102.2 °F)] 36.1 °C (96.9 °F)  Pulse:  [62-90] 72  Resp:  [17-18] 17  BP: (108-130)/(60-69) 123/67  SpO2:  [93 %-96 %] 95 %  Physical Exam  Constitutional:       Appearance: Normal appearance.   HENT:      Head: Normocephalic and  atraumatic.   Cardiovascular:      Rate and Rhythm: Normal rate and regular rhythm.      Heart sounds: Normal heart sounds.   Pulmonary:      Effort: Pulmonary effort is normal.      Breath sounds: Normal breath sounds.   Abdominal:      Palpations: Abdomen is soft.      Tenderness: There is abdominal tenderness in the left lower quadrant.   Skin:     Coloration: Skin is not jaundiced or pale.   Neurological:      General: No focal deficit present.      Mental Status: She is alert and oriented to person, place, and time.       Labs:   Recent Labs     08/04/22  0959 08/05/22  0236   WBC 12.0* 5.8   RBC 4.89 4.38   HEMOGLOBIN 14.5 12.9   HEMATOCRIT 42.9 39.3   MCV 87.7 89.7   MCH 29.7 29.5   RDW 46.0 49.4   PLATELETCT 263 195   MPV 9.3 10.1   NEUTSPOLYS 93.10* 85.30*   LYMPHOCYTES 3.40* 10.10*   MONOCYTES 2.70 3.80   EOSINOPHILS 0.00 0.20   BASOPHILS 0.20 0.30     Recent Labs     08/04/22  0959 08/05/22  0236   SODIUM 136 141   POTASSIUM 3.9 3.4*   CHLORIDE 102 107   CO2 18* 19*   GLUCOSE 146* 87   BUN 18 15       Imaging:   US-RUQ   Final Result      1.  Gallbladder wall thickening, potentially in part due to partially contracted state although cholecystitis is not excluded.  No gallstone demonstrated.   2.  No biliary dilation.   3.  Small RIGHT kidney cyst for which no further evaluation is necessary.      [id number]-M/O    (Results Pending)   [id number]-I/O    (Results Pending)       Problem Representation:   Sima is a 62 y.o. female with no significant past medical history who presented 8/4/2022 with 2 month history of intermittent cramping RUQ post-prandial pain with workup remarkable for elevated LFTs, ALP, bilirubin and u/s evidence of gallbladder wall thickening, most consistent with acute cholecystitis with possible stone passed vs acute cholangitis.    Assessment/Plan:   Bile obstruction  Elevated liver enzymes  Elevated bilirubin  - Patient initially presented with RUQ pain consistent with symptomatic bi  has evidence of cholestasis with elevated LFTs, alk phos, bili with ultrasound evidence of gallbladder wall thickening with no stone present and no biliary duct dilation. Possible cholecystitis vs. Cholangitis.  Initially presented with ABD pain in associated N/V.  Mild leukocytosis with elevated LFTs, bilirubin and alk phos.  U/S demonstrated gallbladder wall thickening, however no stones or bile duct dilation. Hepatitis panel negative, no history of alcohol use or other hepatotoxins.  Plan:   - MRCP pending. If evidence of CBD stone, proceed to ERCP. Possible surgical intervention if any acute symptoms arise. NPO at midnight in case of procedure.      Leukocytosis  - Initial leukocytosis on admission at WBC 12. Has decreased to 5.8. Possibly related to cholecystitis vs acute cholangitis  Plan:   - Antibiotics: continue ceftriaxone and flagyl.   - Continue to trend

## 2022-08-05 NOTE — ASSESSMENT & PLAN NOTE
* Bile obstruction- (present on admission)  Assessment & Plan  Possible cholecystitis/cholangitis  Came with abdominal pain, nausea vomiting  Leukocytosis and elevated liver enzymes and bilirubin  Ultrasound showed possible cholecystitis however no common bile duct dilation  Will order MRCP and continue antibiotic and IV fluid  GI on board, appreciate recommendations  Patient might need surgery consult letter.    Elevated bilirubin  Assessment & Plan  Possible obstruction stone versus mass  MRCP and ERCP if needed  Patient might need surgery  Labs daily    Leukocytosis  Assessment & Plan  Possible related to cholangitis and cholecystitis  IV antibiotic ceftriaxone and Flagyl  Labs daily    Elevated liver enzymes  Assessment & Plan  With elevated bilirubin and abdominal pain  Possible cholangitis versus stones  MRCP and possible ERCP, GI on board  Patient denied history of alcoholism  Viral hepatitis panel is negative   The patient is a 82y Female complaining of chest pain.

## 2022-08-06 ENCOUNTER — ANESTHESIA (OUTPATIENT)
Dept: SURGERY | Facility: MEDICAL CENTER | Age: 62
DRG: 418 | End: 2022-08-06
Payer: MEDICAID

## 2022-08-06 ENCOUNTER — APPOINTMENT (OUTPATIENT)
Dept: RADIOLOGY | Facility: MEDICAL CENTER | Age: 62
DRG: 418 | End: 2022-08-06
Attending: INTERNAL MEDICINE
Payer: MEDICAID

## 2022-08-06 ENCOUNTER — ANESTHESIA EVENT (OUTPATIENT)
Dept: SURGERY | Facility: MEDICAL CENTER | Age: 62
DRG: 418 | End: 2022-08-06
Payer: MEDICAID

## 2022-08-06 PROBLEM — D72.810 LYMPHOPENIA: Status: ACTIVE | Noted: 2022-08-06

## 2022-08-06 PROBLEM — K80.50 CHOLEDOCHOLITHIASIS: Status: ACTIVE | Noted: 2022-08-04

## 2022-08-06 LAB
ALBUMIN SERPL BCP-MCNC: 3 G/DL (ref 3.2–4.9)
ALBUMIN/GLOB SERPL: 1.1 G/DL
ALP SERPL-CCNC: 249 U/L (ref 30–99)
ALT SERPL-CCNC: 343 U/L (ref 2–50)
ANION GAP SERPL CALC-SCNC: 10 MMOL/L (ref 7–16)
AST SERPL-CCNC: 178 U/L (ref 12–45)
BASOPHILS # BLD AUTO: 0.4 % (ref 0–1.8)
BASOPHILS # BLD: 0.02 K/UL (ref 0–0.12)
BILIRUB SERPL-MCNC: 1.2 MG/DL (ref 0.1–1.5)
BUN SERPL-MCNC: 12 MG/DL (ref 8–22)
CALCIUM SERPL-MCNC: 8.3 MG/DL (ref 8.5–10.5)
CHLORIDE SERPL-SCNC: 109 MMOL/L (ref 96–112)
CO2 SERPL-SCNC: 20 MMOL/L (ref 20–33)
CREAT SERPL-MCNC: 0.63 MG/DL (ref 0.5–1.4)
EOSINOPHIL # BLD AUTO: 0.13 K/UL (ref 0–0.51)
EOSINOPHIL NFR BLD: 2.8 % (ref 0–6.9)
ERYTHROCYTE [DISTWIDTH] IN BLOOD BY AUTOMATED COUNT: 47.8 FL (ref 35.9–50)
GFR SERPLBLD CREATININE-BSD FMLA CKD-EPI: 99 ML/MIN/1.73 M 2
GLOBULIN SER CALC-MCNC: 2.8 G/DL (ref 1.9–3.5)
GLUCOSE SERPL-MCNC: 96 MG/DL (ref 65–99)
HCT VFR BLD AUTO: 35.6 % (ref 37–47)
HGB BLD-MCNC: 12 G/DL (ref 12–16)
IMM GRANULOCYTES # BLD AUTO: 0.02 K/UL (ref 0–0.11)
IMM GRANULOCYTES NFR BLD AUTO: 0.4 % (ref 0–0.9)
LYMPHOCYTES # BLD AUTO: 0.86 K/UL (ref 1–4.8)
LYMPHOCYTES NFR BLD: 18.4 % (ref 22–41)
MCH RBC QN AUTO: 29.8 PG (ref 27–33)
MCHC RBC AUTO-ENTMCNC: 33.7 G/DL (ref 33.6–35)
MCV RBC AUTO: 88.3 FL (ref 81.4–97.8)
MONOCYTES # BLD AUTO: 0.45 K/UL (ref 0–0.85)
MONOCYTES NFR BLD AUTO: 9.6 % (ref 0–13.4)
NEUTROPHILS # BLD AUTO: 3.2 K/UL (ref 2–7.15)
NEUTROPHILS NFR BLD: 68.4 % (ref 44–72)
NRBC # BLD AUTO: 0 K/UL
NRBC BLD-RTO: 0 /100 WBC
NUCLEAR IGG SER QL IA: NORMAL
PLATELET # BLD AUTO: 194 K/UL (ref 164–446)
PMV BLD AUTO: 9.6 FL (ref 9–12.9)
POTASSIUM SERPL-SCNC: 3.7 MMOL/L (ref 3.6–5.5)
PROT SERPL-MCNC: 5.8 G/DL (ref 6–8.2)
RBC # BLD AUTO: 4.03 M/UL (ref 4.2–5.4)
SODIUM SERPL-SCNC: 139 MMOL/L (ref 135–145)
WBC # BLD AUTO: 4.7 K/UL (ref 4.8–10.8)

## 2022-08-06 PROCEDURE — 160035 HCHG PACU - 1ST 60 MINS PHASE I: Performed by: INTERNAL MEDICINE

## 2022-08-06 PROCEDURE — 85025 COMPLETE CBC W/AUTO DIFF WBC: CPT

## 2022-08-06 PROCEDURE — 160002 HCHG RECOVERY MINUTES (STAT): Performed by: INTERNAL MEDICINE

## 2022-08-06 PROCEDURE — C2617 STENT, NON-COR, TEM W/O DEL: HCPCS | Performed by: INTERNAL MEDICINE

## 2022-08-06 PROCEDURE — 770001 HCHG ROOM/CARE - MED/SURG/GYN PRIV*

## 2022-08-06 PROCEDURE — 00732 ANES UPR GI NDSC PX ERCP: CPT | Performed by: ANESTHESIOLOGY

## 2022-08-06 PROCEDURE — 700105 HCHG RX REV CODE 258: Performed by: ANESTHESIOLOGY

## 2022-08-06 PROCEDURE — 0F798DZ DILATION OF COMMON BILE DUCT WITH INTRALUMINAL DEVICE, VIA NATURAL OR ARTIFICIAL OPENING ENDOSCOPIC: ICD-10-PCS | Performed by: INTERNAL MEDICINE

## 2022-08-06 PROCEDURE — 0FC98ZZ EXTIRPATION OF MATTER FROM COMMON BILE DUCT, VIA NATURAL OR ARTIFICIAL OPENING ENDOSCOPIC: ICD-10-PCS | Performed by: INTERNAL MEDICINE

## 2022-08-06 PROCEDURE — 700111 HCHG RX REV CODE 636 W/ 250 OVERRIDE (IP): Performed by: INTERNAL MEDICINE

## 2022-08-06 PROCEDURE — 36415 COLL VENOUS BLD VENIPUNCTURE: CPT

## 2022-08-06 PROCEDURE — 160009 HCHG ANES TIME/MIN: Performed by: INTERNAL MEDICINE

## 2022-08-06 PROCEDURE — BF101ZZ FLUOROSCOPY OF BILE DUCTS USING LOW OSMOLAR CONTRAST: ICD-10-PCS | Performed by: INTERNAL MEDICINE

## 2022-08-06 PROCEDURE — A9270 NON-COVERED ITEM OR SERVICE: HCPCS | Performed by: INTERNAL MEDICINE

## 2022-08-06 PROCEDURE — 700101 HCHG RX REV CODE 250: Performed by: INTERNAL MEDICINE

## 2022-08-06 PROCEDURE — 160202 HCHG ENDO MINUTES - 1ST 30 MINS LEVEL 3: Performed by: INTERNAL MEDICINE

## 2022-08-06 PROCEDURE — 80053 COMPREHEN METABOLIC PANEL: CPT

## 2022-08-06 PROCEDURE — 700101 HCHG RX REV CODE 250: Performed by: ANESTHESIOLOGY

## 2022-08-06 PROCEDURE — C1889 IMPLANT/INSERT DEVICE, NOC: HCPCS | Performed by: INTERNAL MEDICINE

## 2022-08-06 PROCEDURE — 99232 SBSQ HOSP IP/OBS MODERATE 35: CPT | Mod: GC | Performed by: INTERNAL MEDICINE

## 2022-08-06 PROCEDURE — 700111 HCHG RX REV CODE 636 W/ 250 OVERRIDE (IP): Performed by: ANESTHESIOLOGY

## 2022-08-06 PROCEDURE — 700105 HCHG RX REV CODE 258: Performed by: INTERNAL MEDICINE

## 2022-08-06 PROCEDURE — 700117 HCHG RX CONTRAST REV CODE 255: Performed by: INTERNAL MEDICINE

## 2022-08-06 PROCEDURE — 160048 HCHG OR STATISTICAL LEVEL 1-5: Performed by: INTERNAL MEDICINE

## 2022-08-06 PROCEDURE — 160207 HCHG ENDO MINUTES - EA ADDL 1 MIN LEVEL 3: Performed by: INTERNAL MEDICINE

## 2022-08-06 PROCEDURE — 700102 HCHG RX REV CODE 250 W/ 637 OVERRIDE(OP): Performed by: INTERNAL MEDICINE

## 2022-08-06 DEVICE — ADVANIX BILIARY CENTER BEND 10X5: Type: IMPLANTABLE DEVICE | Site: ESOPHAGUS | Status: FUNCTIONAL

## 2022-08-06 RX ORDER — ROCURONIUM BROMIDE 10 MG/ML
INJECTION, SOLUTION INTRAVENOUS PRN
Status: DISCONTINUED | OUTPATIENT
Start: 2022-08-06 | End: 2022-08-06 | Stop reason: SURG

## 2022-08-06 RX ORDER — OXYCODONE HCL 5 MG/5 ML
5 SOLUTION, ORAL ORAL
Status: DISCONTINUED | OUTPATIENT
Start: 2022-08-06 | End: 2022-08-06 | Stop reason: HOSPADM

## 2022-08-06 RX ORDER — MEPERIDINE HYDROCHLORIDE 25 MG/ML
6.25 INJECTION INTRAMUSCULAR; INTRAVENOUS; SUBCUTANEOUS
Status: DISCONTINUED | OUTPATIENT
Start: 2022-08-06 | End: 2022-08-06 | Stop reason: HOSPADM

## 2022-08-06 RX ORDER — MIDAZOLAM HYDROCHLORIDE 1 MG/ML
1 INJECTION INTRAMUSCULAR; INTRAVENOUS
Status: DISCONTINUED | OUTPATIENT
Start: 2022-08-06 | End: 2022-08-06 | Stop reason: HOSPADM

## 2022-08-06 RX ORDER — LIDOCAINE HYDROCHLORIDE 20 MG/ML
INJECTION, SOLUTION EPIDURAL; INFILTRATION; INTRACAUDAL; PERINEURAL PRN
Status: DISCONTINUED | OUTPATIENT
Start: 2022-08-06 | End: 2022-08-06 | Stop reason: SURG

## 2022-08-06 RX ORDER — DEXAMETHASONE SODIUM PHOSPHATE 4 MG/ML
INJECTION, SOLUTION INTRA-ARTICULAR; INTRALESIONAL; INTRAMUSCULAR; INTRAVENOUS; SOFT TISSUE PRN
Status: DISCONTINUED | OUTPATIENT
Start: 2022-08-06 | End: 2022-08-06 | Stop reason: SURG

## 2022-08-06 RX ORDER — INDOMETHACIN 50 MG/1
100 SUPPOSITORY RECTAL ONCE
Status: COMPLETED | OUTPATIENT
Start: 2022-08-06 | End: 2022-08-06

## 2022-08-06 RX ORDER — ONDANSETRON 2 MG/ML
4 INJECTION INTRAMUSCULAR; INTRAVENOUS
Status: DISCONTINUED | OUTPATIENT
Start: 2022-08-06 | End: 2022-08-06 | Stop reason: HOSPADM

## 2022-08-06 RX ORDER — HYDROMORPHONE HYDROCHLORIDE 1 MG/ML
0.4 INJECTION, SOLUTION INTRAMUSCULAR; INTRAVENOUS; SUBCUTANEOUS
Status: DISCONTINUED | OUTPATIENT
Start: 2022-08-06 | End: 2022-08-06 | Stop reason: HOSPADM

## 2022-08-06 RX ORDER — ALBUTEROL SULFATE 2.5 MG/3ML
2.5 SOLUTION RESPIRATORY (INHALATION)
Status: DISCONTINUED | OUTPATIENT
Start: 2022-08-06 | End: 2022-08-06 | Stop reason: HOSPADM

## 2022-08-06 RX ORDER — OXYCODONE HCL 5 MG/5 ML
10 SOLUTION, ORAL ORAL
Status: DISCONTINUED | OUTPATIENT
Start: 2022-08-06 | End: 2022-08-06 | Stop reason: HOSPADM

## 2022-08-06 RX ORDER — SODIUM CHLORIDE, SODIUM LACTATE, POTASSIUM CHLORIDE, CALCIUM CHLORIDE 600; 310; 30; 20 MG/100ML; MG/100ML; MG/100ML; MG/100ML
INJECTION, SOLUTION INTRAVENOUS CONTINUOUS
Status: DISCONTINUED | OUTPATIENT
Start: 2022-08-06 | End: 2022-08-06 | Stop reason: HOSPADM

## 2022-08-06 RX ORDER — DIPHENHYDRAMINE HYDROCHLORIDE 50 MG/ML
12.5 INJECTION INTRAMUSCULAR; INTRAVENOUS
Status: DISCONTINUED | OUTPATIENT
Start: 2022-08-06 | End: 2022-08-06 | Stop reason: HOSPADM

## 2022-08-06 RX ORDER — HYDROMORPHONE HYDROCHLORIDE 1 MG/ML
0.1 INJECTION, SOLUTION INTRAMUSCULAR; INTRAVENOUS; SUBCUTANEOUS
Status: DISCONTINUED | OUTPATIENT
Start: 2022-08-06 | End: 2022-08-06 | Stop reason: HOSPADM

## 2022-08-06 RX ORDER — HYDROMORPHONE HYDROCHLORIDE 1 MG/ML
0.2 INJECTION, SOLUTION INTRAMUSCULAR; INTRAVENOUS; SUBCUTANEOUS
Status: DISCONTINUED | OUTPATIENT
Start: 2022-08-06 | End: 2022-08-06 | Stop reason: HOSPADM

## 2022-08-06 RX ORDER — ONDANSETRON 2 MG/ML
INJECTION INTRAMUSCULAR; INTRAVENOUS PRN
Status: DISCONTINUED | OUTPATIENT
Start: 2022-08-06 | End: 2022-08-06 | Stop reason: SURG

## 2022-08-06 RX ORDER — SODIUM CHLORIDE, SODIUM LACTATE, POTASSIUM CHLORIDE, CALCIUM CHLORIDE 600; 310; 30; 20 MG/100ML; MG/100ML; MG/100ML; MG/100ML
INJECTION, SOLUTION INTRAVENOUS
Status: DISCONTINUED | OUTPATIENT
Start: 2022-08-06 | End: 2022-08-06 | Stop reason: SURG

## 2022-08-06 RX ORDER — LABETALOL HYDROCHLORIDE 5 MG/ML
5 INJECTION, SOLUTION INTRAVENOUS
Status: DISCONTINUED | OUTPATIENT
Start: 2022-08-06 | End: 2022-08-06 | Stop reason: HOSPADM

## 2022-08-06 RX ORDER — HALOPERIDOL 5 MG/ML
1 INJECTION INTRAMUSCULAR
Status: DISCONTINUED | OUTPATIENT
Start: 2022-08-06 | End: 2022-08-06 | Stop reason: HOSPADM

## 2022-08-06 RX ADMIN — CEFTRIAXONE SODIUM 1 G: 10 INJECTION, POWDER, FOR SOLUTION INTRAVENOUS at 06:17

## 2022-08-06 RX ADMIN — SODIUM CHLORIDE, POTASSIUM CHLORIDE, SODIUM LACTATE AND CALCIUM CHLORIDE: 600; 310; 30; 20 INJECTION, SOLUTION INTRAVENOUS at 13:31

## 2022-08-06 RX ADMIN — ROCURONIUM BROMIDE 50 MG: 10 INJECTION, SOLUTION INTRAVENOUS at 13:33

## 2022-08-06 RX ADMIN — INDOMETHACIN 100 MG: 50 SUPPOSITORY RECTAL at 15:21

## 2022-08-06 RX ADMIN — SUGAMMADEX 200 MG: 100 INJECTION, SOLUTION INTRAVENOUS at 14:59

## 2022-08-06 RX ADMIN — SENNOSIDES AND DOCUSATE SODIUM 2 TABLET: 50; 8.6 TABLET ORAL at 06:17

## 2022-08-06 RX ADMIN — ONDANSETRON 4 MG: 2 INJECTION INTRAMUSCULAR; INTRAVENOUS at 13:33

## 2022-08-06 RX ADMIN — METRONIDAZOLE 500 MG: 5 INJECTION, SOLUTION INTRAVENOUS at 06:17

## 2022-08-06 RX ADMIN — LIDOCAINE HYDROCHLORIDE 100 MG: 20 INJECTION, SOLUTION EPIDURAL; INFILTRATION; INTRACAUDAL at 13:33

## 2022-08-06 RX ADMIN — DEXAMETHASONE SODIUM PHOSPHATE 8 MG: 4 INJECTION, SOLUTION INTRA-ARTICULAR; INTRALESIONAL; INTRAMUSCULAR; INTRAVENOUS; SOFT TISSUE at 13:33

## 2022-08-06 RX ADMIN — METRONIDAZOLE 500 MG: 5 INJECTION, SOLUTION INTRAVENOUS at 12:33

## 2022-08-06 RX ADMIN — PROPOFOL 80 MG: 10 INJECTION, EMULSION INTRAVENOUS at 13:33

## 2022-08-06 RX ADMIN — SODIUM CHLORIDE: 9 INJECTION, SOLUTION INTRAVENOUS at 12:33

## 2022-08-06 RX ADMIN — METRONIDAZOLE 500 MG: 5 INJECTION, SOLUTION INTRAVENOUS at 22:14

## 2022-08-06 ASSESSMENT — ENCOUNTER SYMPTOMS
WEIGHT LOSS: 0
NECK PAIN: 0
CARDIOVASCULAR NEGATIVE: 1
BACK PAIN: 0
GASTROINTESTINAL NEGATIVE: 1
SHORTNESS OF BREATH: 0
DIARRHEA: 0
NAUSEA: 0
CONSTITUTIONAL NEGATIVE: 1
CHILLS: 0
BLURRED VISION: 0
COUGH: 0
FEVER: 0
PALPITATIONS: 0
RESPIRATORY NEGATIVE: 1
SPEECH CHANGE: 0
SPUTUM PRODUCTION: 0
MUSCULOSKELETAL NEGATIVE: 1
DOUBLE VISION: 0
HEADACHES: 0
MYALGIAS: 0
NEUROLOGICAL NEGATIVE: 1
ABDOMINAL PAIN: 0
VOMITING: 0
SENSORY CHANGE: 0
EYES NEGATIVE: 1

## 2022-08-06 ASSESSMENT — PAIN DESCRIPTION - PAIN TYPE
TYPE: SURGICAL PAIN

## 2022-08-06 ASSESSMENT — PAIN SCALES - GENERAL: PAIN_LEVEL: 2

## 2022-08-06 NOTE — ANESTHESIA PROCEDURE NOTES
Airway    Date/Time: 8/6/2022 1:34 PM  Performed by: Olivier Judd M.D.  Authorized by: Olivier Judd M.D.     Location:  OR  Urgency:  Elective  Indications for Airway Management:  Anesthesia      Spontaneous Ventilation: absent    Sedation Level:  Deep  Preoxygenated: Yes    Patient Position:  Sniffing  MILS Maintained Throughout: Yes    Mask Difficulty Assessment:  1 - vent by mask  Final Airway Type:  Endotracheal airway  Final Endotracheal Airway:  ETT  Cuffed: Yes    Technique Used for Successful ETT Placement:  Direct laryngoscopy    Insertion Site:  Oral  Blade Type:  Thornton  Laryngoscope Blade/Videolaryngoscope Blade Size:  2  ETT Size (mm):  7.0  Measured from:  Lips  ETT to Lips (cm):  20  Placement Verified by: capnometry    Cormack-Lehane Classification:  Grade I - full view of glottis  Number of Attempts at Approach:  1

## 2022-08-06 NOTE — ANESTHESIA POSTPROCEDURE EVALUATION
Patient: Sima Plaza    Procedure Summary     Date: 08/06/22 Room / Location: [facility] OR 05 / SURGERY [facility]    Anesthesia Start: 1331 Anesthesia Stop: 1506    Procedures:       ERCP (ENDOSCOPIC RETROGRADE CHOLANGIOPANCREATOGRAPHY) (N/A Esophagus)      ERCP, WITH CALCULUS REMOVAL FROM BILE OR PANCREATIC DUCT (N/A Esophagus) Diagnosis: (choledocholithiasis)    Surgeons: Mert Otero M.D. Responsible Provider: Olivier Judd M.D.    Anesthesia Type: general ASA Status: 2          Final Anesthesia Type: general  Last vitals  BP   Blood Pressure: (!) 168/78    Temp   36.6 °C (97.8 °F)    Pulse   62   Resp   17    SpO2   90 %      Anesthesia Post Evaluation    Patient location during evaluation: PACU  Patient participation: complete - patient participated  Level of consciousness: awake and alert  Pain score: 2    Airway patency: patent  Anesthetic complications: no  Cardiovascular status: adequate and hemodynamically stable  Respiratory status: acceptable  Hydration status: acceptable    PONV: none          No complications documented.     Nurse Pain Score: 0 (NPRS)

## 2022-08-06 NOTE — CARE PLAN
The patient is Stable - Low risk of patient condition declining or worsening    Shift Goals  Clinical Goals: safety and monitor  Patient Goals: get better  Family Goals: unique    Progress made toward(s) clinical / shift goals:    No complains of nausea and pain. Provided bedside care to patient. Pt remained safe during the shift.    Problem: Gastrointestinal Irritability  Goal: Nausea and vomiting will be absent or improve  Outcome: Progressing

## 2022-08-06 NOTE — ANESTHESIA PREPROCEDURE EVALUATION
Case: [id number] Date/Time: 08/06/22 1250    Procedure: ERCP (ENDOSCOPIC RETROGRADE CHOLANGIOPANCREATOGRAPHY)    Location: [place] [address] / SURGERY [facility]    Surgeons: Mert Otero M.D.        62yoF    NPO  No AC  NKDA  No home meds      Relevant Problems   No relevant active problems       Physical Exam    Airway   Mallampati: II       Cardiovascular - normal exam     Dental - normal exam           Pulmonary - normal exam     Abdominal - normal exam     Neurological - normal exam                 Anesthesia Plan    ASA 2       Plan - general       Airway plan will be ETT          Induction: intravenous    Postoperative Plan: Postoperative administration of opioids is intended.    Pertinent diagnostic labs and testing reviewed    Informed Consent:    Anesthetic plan and risks discussed with patient.

## 2022-08-06 NOTE — OR SURGEON
Post OP Note    PreOp Diagnosis: CBD stones       PostOp Diagnosis:    EGD     Scope passed to second portion of the duodenum to the ampulla, what was visualized appeared normal.    ERCP   1/cannulation of the common bile duct was achieved using a Dru-Cut sphincterotome via 0.035 mm guidewire.  Via the assistance of fluoroscopy guidance   2/pancreatogram with spillage of the contrast was achieved inadvertently.   3/contrast of the common bile duct demonstrated a dilated duct approximately 1 cm with a filling defect in the distal portion.   4/sphincterotomy of approximately 8 mm was performed   5/exchange was performed introducing over the guidewire a 9-12 mm balloon, twice initial balloons burst   6/finally a 10 5 plastic [place] biliary stent was placed with good biliary drainage appreciated endoscopically and radiologically.    Cholangiogram images were interpreted by myself throughout the procedure with fluoroscopy guidance    Cholangiogram   Demonstrated dilated common bile duct normal-appearing intrahepatic ducts of the left and right system, a filling defect was appreciated the distal portion of the common bile duct approximately 6 mm in size.    Procedure(s):  ERCP (ENDOSCOPIC RETROGRADE CHOLANGIOPANCREATOGRAPHY) - Wound Class: Clean Contaminated  ERCP, WITH CALCULUS REMOVAL FROM BILE OR PANCREATIC DUCT - Wound Class: Clean Contaminated    Surgeon(s):  Mert Otero M.D.    Anesthesiologist/Type of Anesthesia:  Anesthesiologist: Olivier Judd M.D./General    Surgical Staff:  Endoscopy Technician: Bryanna Newell  Radiology Technologist: Piero Hilliard  Endoscopy Nurse: Noa Kumar R.N.    Specimens removed if any:  * No specimens in log *    Estimated Blood Loss: none    Findings:     Prior to the procedure the patient was informed the risks and benefits of the procedure and freely signed the consent form.  She was monitored in standard monitoring blood pressure oxygen heart monitor no appreciable  abnormalities were noted during the procedure.  Once adequate sedation was achieved she was placed in the prone position head rotated to the right bite-block was placed.  Introduction of the standard Olympus ERCP scope was performed under indirect visualization through the oropharynx and passed to the second portion of the duodenum.  While portions of the esophagus stomach and antrum were evaluated appeared normal.  The ampulla Vater was brought en face.  Cannulation using the Dru-Cut sphincterotome via 0.035 mm guidewire was performed, cholangiogram demonstrated a dilated common bile duct of 1 cm with spillage into the pancreatic duct which appeared normal.  Sphincterotomy was performed approximately 8 mm at this time with good bilious drainage appreciated.  Exchange was performed introducing a 9-12 mm balloon and several sweeps were performed to extract the filling defect appreciated on the cholangiogram.  The balloon burst on 2 occasions only extracting biliary debris and sludge.  At this juncture a 10/5 plastic biliary [place] stent was placed with good biliary drainage appreciated endoscopically.  Due to technical limitations of the support staff this was felt to be the best option given lithotripsy could not be performed at this time, because of this.      Complications: None    Recommendations    Indomethacin suppository 100 mg IN 1 time postop  Liters worth of lactated Ringer's IV  Check CBC CMP and lipase in the morning  Clear liquid diet  Analgesics and antiemetics as needed  Call if any questions or concerns [phone].          8/6/2022 3:04 PM Mert Otero M.D.

## 2022-08-06 NOTE — PROGRESS NOTES
Received report from dayshift RN, assumed care of patient at change of shift. Patient is A&Ox4, on RA, not in respiratory distress. Denies pain at this time. Assessment completed and POC discussed. Bed in lowest position and call light within reach.

## 2022-08-06 NOTE — ANESTHESIA TIME REPORT
Anesthesia Start and Stop Event Times     Date Time Event    8/6/2022 1310 Ready for Procedure     1331 Anesthesia Start     1506 Anesthesia Stop        Responsible Staff  08/06/22    Name Role Begin End    Olivier Judd M.D. Anesth 1331 1506        Overtime Reason:  no overtime (within assigned shift)    Comments:

## 2022-08-06 NOTE — PROGRESS NOTES
[facility] Internal Medicine Daily Progress Note    Date of Service  8/6/2022    R Team: R IM Purple Team   Attending: Caridad Oakley M.d.  Senior Resident: Dr. Song  Intern:  Dr. Bridges  Contact Number: [phone]    Chief Complaint  Sima Plaza is a 62 y.o. female admitted 8/4/2022 with abdominal pain    Hospital Course  62-year-old female with no significant PMH presenting on 8/4 with abdominal pain.  Has had epigastric pain for over 2 months, aggravated by eating, comes and goes, and associated with N/V.  Also developed low-grade fever and chills, denied cardiorespiratory symptoms.  Admission labs WBC 12, , , 6.4 bilirubin.  U/S demonstrated gallbladder wall thickening, no stones, and no bile duct dilatation.  Started on IV fluids with ceftriaxone and Flagyl.  GI was consulted. MRCP positive for CBD stone, now pending ERCP      Interval Problem Update  Patient denied any nausea or vomiting this morning and was resting comfortably.  MRCP resulted, positive for choledocholithiasis with dilation of CBD measuring 10 mm.  GI notified, patient pending ERCP this afternoon.    I have discussed this patient's plan of care and discharge plan at IDT rounds today with Case Management, Nursing, Nursing leadership, and other members of the IDT team.    Consultants/Specialty  GI    Code Status  Full Code    Disposition  Patient is not medically cleared for discharge.   Anticipate discharge to to home with close outpatient follow-up.  I have placed the appropriate orders for post-discharge needs.    Review of Systems  Review of Systems   Constitutional: Negative for chills, fever, malaise/fatigue and weight loss.   HENT: Negative for ear discharge and ear pain.    Eyes: Negative for blurred vision and double vision.   Respiratory: Negative for sputum production and shortness of breath.    Cardiovascular: Negative for chest pain and palpitations.   Gastrointestinal: Negative for abdominal pain, diarrhea, nausea and  vomiting.   Musculoskeletal: Negative for back pain and neck pain.   Neurological: Negative for sensory change and speech change.        Physical Exam  Temp:  [36.1 °C (96.9 °F)-36.6 °C (97.9 °F)] 36.3 °C (97.4 °F)  Pulse:  [56-72] 64  Resp:  [16-20] 16  BP: (110-181)/(58-80) 181/80  SpO2:  [95 %-97 %] 95 %    Physical Exam  Constitutional:       General: She is not in acute distress.     Appearance: She is not toxic-appearing.   HENT:      Head: Normocephalic and atraumatic.      Right Ear: External ear normal.      Left Ear: External ear normal.      Nose: Nose normal.      Mouth/Throat:      Pharynx: Oropharynx is clear.   Eyes:      Extraocular Movements: Extraocular movements intact.      Pupils: Pupils are equal, round, and reactive to light.   Cardiovascular:      Rate and Rhythm: Normal rate and regular rhythm.   Pulmonary:      Effort: Pulmonary effort is normal.      Breath sounds: Normal breath sounds.   Abdominal:      General: Abdomen is flat.      Palpations: Abdomen is soft.   Musculoskeletal:      Cervical back: Normal range of motion.      Right lower leg: No edema.      Left lower leg: No edema.   Neurological:      General: No focal deficit present.      Mental Status: She is alert and oriented to person, place, and time.   Psychiatric:         Mood and Affect: Mood normal.         Behavior: Behavior normal.         Thought Content: Thought content normal.         Fluids    Intake/Output Summary (Last 24 hours) at 8/6/2022 1308  Last data filed at 8/5/2022 2010  Gross per 24 hour   Intake 460 ml   Output --   Net 460 ml       Laboratory  Recent Labs     08/04/22  0959 08/05/22  0236 08/06/22  0609   WBC 12.0* 5.8 4.7*   RBC 4.89 4.38 4.03*   HEMOGLOBIN 14.5 12.9 12.0   HEMATOCRIT 42.9 39.3 35.6*   MCV 87.7 89.7 88.3   MCH 29.7 29.5 29.8   MCHC 33.8 32.8* 33.7   RDW 46.0 49.4 47.8   PLATELETCT 263 195 194   MPV 9.3 10.1 9.6     Recent Labs     08/04/22  0959 08/05/22  0236 08/06/22  0609   SODIUM  136 141 139   POTASSIUM 3.9 3.4* 3.7   CHLORIDE 102 107 109   CO2 18* 19* 20   GLUCOSE 146* 87 96   BUN 18 15 12   CREATININE 0.60 0.70 0.63   CALCIUM 9.2 8.5 8.3*                   Imaging  [id number]   Final Result      1.  Positive for choledocholithiasis      2.  Associated biliary dilation with common bile duct measuring 10 mm      3.  No gallstones are identified. The gallbladder is partially contracted limiting the sensitivity for gallstones.      US-RUQ   Final Result      1.  Gallbladder wall thickening, potentially in part due to partially contracted state although cholecystitis is not excluded.  No gallstone demonstrated.   2.  No biliary dilation.   3.  Small RIGHT kidney cyst for which no further evaluation is necessary.      DX-PORTABLE FLUORO > 1 HOUR    (Results Pending)          Assessment/Plan  Problem Representation:    * Choledocholithiasis- (present on admission)  Assessment & Plan    Initially presented with ABD pain in associated N/V.  Mild leukocytosis with elevated LFTs, bilirubin and alk phos.  U/S demonstrated gallbladder wall thickening, however no stones or bile duct dilation.  MRCP consistent with choledocholithiasis with CBD dilation measuring 10 mm.    -ERCP scheduled for this afternoon  -Follow GI recs, consider cholecystectomy depending on GI.    Lymphopenia  Assessment & Plan  Absolute lymphocyte count 0.86, values less than 1 can be suggestive of low CD4 count  Check HIV screen    Elevated bilirubin- (present on admission)  Assessment & Plan  Likely secondary to obstruction.  Associated elevated alk phos.  Ultrasound did not demonstrate stones or bile duct dilation.    -MRCP, and ERCP if positive for CBD stone  -Possibility of surgery if patient decompensates  -Monitor through labs    Leukocytosis- (present on admission)  Assessment & Plan  RESOLVED  Likely related to choledocholithiasis.  Mild leukocytosis at 12 initially.    -Continue IV ABX ceftriaxone and Flagyl  -Monitor  through labs    Elevated liver enzymes- (present on admission)  Assessment & Plan  Likely secondary to known choledocholithiasis seen on MRCP.  Associated elevated bilirubin and alk phos.  Abdominal pain initially present on admission.  Viral hepatitis panel negative.  No history of alcohol abuse.    -Repeat LFTs downtrending, as well as bilirubin and alk phos  -Continuing Flagyl and C3  -Will continue to trend with labs       VTE prophylaxis: heparin ppx    I have performed a physical exam and reviewed and updated ROS and Plan today (8/6/2022). In review of yesterday's note (8/5/2022), there are no changes except as documented above.

## 2022-08-06 NOTE — PROGRESS NOTES
Gastroenterology Progress Note     Author: Mert Otero M.D.   Date & Time Created: 8/6/2022 12:57 PM    Chief Complaint:  Epigastric pain    Interval History:  She is pain free at the moment    Review of Systems:  Review of Systems   Constitutional: Negative.    HENT: Negative.    Eyes: Negative.    Respiratory: Negative.    Cardiovascular: Negative.    Gastrointestinal: Negative.    Genitourinary: Negative.    Musculoskeletal: Negative.    Neurological: Negative.        Physical Exam:  Physical Exam  Constitutional:       Appearance: Normal appearance.   HENT:      Head: Normocephalic.      Nose: Nose normal.      Mouth/Throat:      Mouth: Mucous membranes are moist.   Eyes:      Pupils: Pupils are equal, round, and reactive to light.   Cardiovascular:      Rate and Rhythm: Normal rate and regular rhythm.      Pulses: Normal pulses.      Heart sounds: Normal heart sounds.   Pulmonary:      Effort: Pulmonary effort is normal.      Breath sounds: Normal breath sounds.   Abdominal:      General: Abdomen is flat. Bowel sounds are normal.      Palpations: Abdomen is soft.   Neurological:      General: No focal deficit present.      Mental Status: She is alert.         Labs:          Recent Labs     08/04/22  0959 08/05/22  0236 08/06/22  0609   SODIUM 136 141 139   POTASSIUM 3.9 3.4* 3.7   CHLORIDE 102 107 109   CO2 18* 19* 20   BUN 18 15 12   CREATININE 0.60 0.70 0.63   MAGNESIUM  --  1.9  --    CALCIUM 9.2 8.5 8.3*     Recent Labs     08/04/22  0959 08/05/22  0236 08/06/22  0609   ALTSGPT 758* 535* 343*   ASTSGOT 640* 356* 178*   ALKPHOSPHAT 352* 284* 249*   TBILIRUBIN 6.4* 3.8* 1.2   LIPASE 33  --   --    GLUCOSE 146* 87 96     Recent Labs     08/04/22  0959 08/05/22  0236 08/06/22  0609   RBC 4.89 4.38 4.03*   HEMOGLOBIN 14.5 12.9 12.0   HEMATOCRIT 42.9 39.3 35.6*   PLATELETCT 263 195 194     Recent Labs     08/04/22  0959 08/05/22  0236 08/06/22  0609   WBC 12.0* 5.8 4.7*   NEUTSPOLYS 93.10* 85.30* 68.40    LYMPHOCYTES 3.40* 10.10* 18.40*   MONOCYTES 2.70 3.80 9.60   EOSINOPHILS 0.00 0.20 2.80   BASOPHILS 0.20 0.30 0.40   ASTSGOT 640* 356* 178*   ALTSGPT 758* 535* 343*   ALKPHOSPHAT 352* 284* 249*   TBILIRUBIN 6.4* 3.8* 1.2     Hemodynamics:  Temp (24hrs), Av.3 °C (97.4 °F), Min:36.1 °C (96.9 °F), Max:36.6 °C (97.9 °F)  Temperature: 36.4 °C (97.5 °F)  Pulse  Av.2  Min: 56  Max: 94   Blood Pressure: 129/58     Respiratory:    Respiration: 16, Pulse Oximetry: 97 %           Fluids:    Intake/Output Summary (Last 24 hours) at 2022 1232  Last data filed at 2022 0500  Gross per 24 hour   Intake 0 ml   Output --   Net 0 ml        GI/Nutrition:  Orders Placed This Encounter   Procedures   • Diet NPO Restrict to: Sips with Medications     Standing Status:   Standing     Number of Occurrences:   1     Order Specific Question:   Diet NPO Restrict to:     Answer:   Sips with Medications [3]     Medical Decision Making, by Problem:  Active Hospital Problems    Diagnosis    • *Bile obstruction [K83.1]    • Elevated liver enzymes [R74.8]    • Leukocytosis [D72.829]    • Elevated bilirubin [R17]    IMPRESSION:     1.  Positive for choledocholithiasis     2.  Associated biliary dilation with common bile duct measuring 10 mm     3.  No gallstones are identified. The gallbladder is partially contracted limiting the sensitivity for gallstones.        Plan:  MRCP positive plan for  ercp     Npo   Consent for ERCP   Call if any issues.  [phone]  Continue supportive care, liquid diet  Quality-Core Measures

## 2022-08-06 NOTE — ASSESSMENT & PLAN NOTE
Absolute lymphocyte count 0.86 on 8/6, values less than 1 can be suggestive of low CD4 count    -Recent labs demonstrate normalization  -HIV screen pending

## 2022-08-06 NOTE — NON-PROVIDER
Daily Progress Note:     Date of Service: 8/6/2022  Primary Team: [facility] IM Purple Team   Attending: Caridad Oakley M.D.   Senior Resident: Dr. Weber Lung  Intern: Dr. Robert Bridges  Contact: [phone]    HPI:   Sima is a 62 y.o. female with no significant past medical history who presented 8/4/2022 with 2 month history of intermittent RUQ/epigastric abdominal and back pain. She describes cramping abdominal pain in RUQ/ epigastric region typically after meals which resolve after a few hours. She also reports fevers and chills at home yesterday which prompted her to go to the ED. Initial workup revealed no leukocytosis, elevated LFTs, ALP, bili, procal as well as U/S evidence of gallbladder wall thickening with no biliary duct dilation.     Subjective:   No acute overnight events. She had several bowel movements last night.Today, patient states that she is feeling much better. She reports no RUQ or epigastric pain. She states that she is hungry and that she would like to go home soon. MRCP positive for choledocholithiasis with associated biliary dilation, GI is following and scheduled for ERCP.    Consultants/Specialty:  GI     Code Status:  Full     Review of Systems:    Review of Systems   Constitutional: Negative for chills, fever, malaise/fatigue and weight loss.   Respiratory: Negative for cough and shortness of breath.    Cardiovascular: Negative for chest pain.   Gastrointestinal: Negative for abdominal pain and diarrhea.   Musculoskeletal: Negative for back pain and myalgias.   Neurological: Negative for headaches.     Objective Data:   Physical Exam:   Vitals:   Temp:  [36.1 °C (96.9 °F)-36.6 °C (97.9 °F)] 36.3 °C (97.4 °F)  Pulse:  [56-72] 64  Resp:  [16-20] 16  BP: (110-181)/(58-80) 181/80  SpO2:  [95 %-97 %] 95 %  Physical Exam  Constitutional:       Appearance: Normal appearance.   HENT:      Head: Normocephalic and atraumatic.   Cardiovascular:      Rate and Rhythm: Normal rate and regular rhythm.       Heart sounds: Normal heart sounds.   Pulmonary:      Effort: Pulmonary effort is normal.      Breath sounds: Normal breath sounds.   Abdominal:      Palpations: Abdomen is soft.      Tenderness: There is abdominal tenderness in the left lower quadrant.   Skin:     Coloration: Skin is not jaundiced or pale.   Neurological:      General: No focal deficit present.      Mental Status: She is alert and oriented to person, place, and time.       Labs:   Recent Labs     08/04/22  0959 08/05/22  0236 08/06/22  0609   WBC 12.0* 5.8 4.7*   RBC 4.89 4.38 4.03*   HEMOGLOBIN 14.5 12.9 12.0   HEMATOCRIT 42.9 39.3 35.6*   MCV 87.7 89.7 88.3   MCH 29.7 29.5 29.8   RDW 46.0 49.4 47.8   PLATELETCT 263 195 194   MPV 9.3 10.1 9.6   NEUTSPOLYS 93.10* 85.30* 68.40   LYMPHOCYTES 3.40* 10.10* 18.40*   MONOCYTES 2.70 3.80 9.60   EOSINOPHILS 0.00 0.20 2.80   BASOPHILS 0.20 0.30 0.40     Recent Labs     08/04/22  0959 08/05/22  0236 08/06/22  0609   SODIUM 136 141 139   POTASSIUM 3.9 3.4* 3.7   CHLORIDE 102 107 109   CO2 18* 19* 20   GLUCOSE 146* 87 96   BUN 18 15 12       Imaging:   [id number]   Final Result      1.  Positive for choledocholithiasis      2.  Associated biliary dilation with common bile duct measuring 10 mm      3.  No gallstones are identified. The gallbladder is partially contracted limiting the sensitivity for gallstones.      US-RUQ   Final Result      1.  Gallbladder wall thickening, potentially in part due to partially contracted state although cholecystitis is not excluded.  No gallstone demonstrated.   2.  No biliary dilation.   3.  Small RIGHT kidney cyst for which no further evaluation is necessary.      DX-PORTABLE FLUORO > 1 HOUR    (Results Pending)       Problem Representation:   Sima is a 62 y.o. female with no significant past medical history who presented 8/4/2022 with 2 month history of intermittent cramping RUQ post-prandial pain with workup remarkable for elevated LFTs, ALP, bilirubin and u/s  evidence of gallbladder wall thickening, most consistent with acute cholecystitis with possible stone passed vs acute cholangitis.    Assessment/Plan:   Choledocholithiasis  Elevated liver enzymes  Elevated bilirubin  - Patient initially presented with RUQ pain consistent with symptomatic bi has evidence of cholestasis with elevated LFTs, alk phos, bili with ultrasound evidence of gallbladder wall thickening with no stone present and no biliary duct dilation. Possible cholecystitis vs. Cholangitis.  Initially presented with ABD pain in associated N/V.  Mild leukocytosis with elevated LFTs, bilirubin and alk phos.  U/S demonstrated gallbladder wall thickening, however no stones or bile duct dilation. Hepatitis panel negative, no history of alcohol use or other hepatotoxins.  - MRCP is positive for choledocholithiasis with associated biliary dilation with common bile duct measuring 10mm. GI is following  Plan:   - ERCP later today     Leukocytosis  - Initial leukocytosis on admission at WBC 12. Has decreased to 5.8. Possibly related to cholecystitis vs acute cholangitis   - Patient also has mild leukopenia (8/6 wbc is 4.7)   Plan:   - Antibiotics: continue ceftriaxone and flagyl.   - Continue to trend   - Obtain HIV panel; leukopenia with can occur with HIV

## 2022-08-07 ENCOUNTER — ANESTHESIA EVENT (OUTPATIENT)
Dept: SURGERY | Facility: MEDICAL CENTER | Age: 62
DRG: 418 | End: 2022-08-07
Payer: MEDICAID

## 2022-08-07 ENCOUNTER — ANESTHESIA (OUTPATIENT)
Dept: SURGERY | Facility: MEDICAL CENTER | Age: 62
DRG: 418 | End: 2022-08-07
Payer: MEDICAID

## 2022-08-07 LAB
ALBUMIN SERPL BCP-MCNC: 3.1 G/DL (ref 3.2–4.9)
ALBUMIN/GLOB SERPL: 1.1 G/DL
ALP SERPL-CCNC: 244 U/L (ref 30–99)
ALT SERPL-CCNC: 254 U/L (ref 2–50)
ANION GAP SERPL CALC-SCNC: 12 MMOL/L (ref 7–16)
AST SERPL-CCNC: 91 U/L (ref 12–45)
BASOPHILS # BLD AUTO: 0.4 % (ref 0–1.8)
BASOPHILS # BLD: 0.02 K/UL (ref 0–0.12)
BILIRUB SERPL-MCNC: 0.7 MG/DL (ref 0.1–1.5)
BUN SERPL-MCNC: 12 MG/DL (ref 8–22)
CALCIUM SERPL-MCNC: 8.5 MG/DL (ref 8.5–10.5)
CHLORIDE SERPL-SCNC: 108 MMOL/L (ref 96–112)
CO2 SERPL-SCNC: 22 MMOL/L (ref 20–33)
CREAT SERPL-MCNC: 0.61 MG/DL (ref 0.5–1.4)
EOSINOPHIL # BLD AUTO: 0.02 K/UL (ref 0–0.51)
EOSINOPHIL NFR BLD: 0.4 % (ref 0–6.9)
ERYTHROCYTE [DISTWIDTH] IN BLOOD BY AUTOMATED COUNT: 48.1 FL (ref 35.9–50)
GFR SERPLBLD CREATININE-BSD FMLA CKD-EPI: 100 ML/MIN/1.73 M 2
GLOBULIN SER CALC-MCNC: 2.8 G/DL (ref 1.9–3.5)
GLUCOSE SERPL-MCNC: 106 MG/DL (ref 65–99)
HCT VFR BLD AUTO: 35.7 % (ref 37–47)
HGB BLD-MCNC: 12 G/DL (ref 12–16)
HIV 1+2 AB+HIV1 P24 AG SERPL QL IA: NORMAL
IMM GRANULOCYTES # BLD AUTO: 0.01 K/UL (ref 0–0.11)
IMM GRANULOCYTES NFR BLD AUTO: 0.2 % (ref 0–0.9)
LIPASE SERPL-CCNC: 103 U/L (ref 11–82)
LYMPHOCYTES # BLD AUTO: 1.14 K/UL (ref 1–4.8)
LYMPHOCYTES NFR BLD: 20 % (ref 22–41)
MCH RBC QN AUTO: 29.5 PG (ref 27–33)
MCHC RBC AUTO-ENTMCNC: 33.6 G/DL (ref 33.6–35)
MCV RBC AUTO: 87.7 FL (ref 81.4–97.8)
MONOCYTES # BLD AUTO: 0.47 K/UL (ref 0–0.85)
MONOCYTES NFR BLD AUTO: 8.2 % (ref 0–13.4)
NEUTROPHILS # BLD AUTO: 4.04 K/UL (ref 2–7.15)
NEUTROPHILS NFR BLD: 70.8 % (ref 44–72)
NRBC # BLD AUTO: 0 K/UL
NRBC BLD-RTO: 0 /100 WBC
PLATELET # BLD AUTO: 221 K/UL (ref 164–446)
PMV BLD AUTO: 9.9 FL (ref 9–12.9)
POTASSIUM SERPL-SCNC: 3.6 MMOL/L (ref 3.6–5.5)
PROT SERPL-MCNC: 5.9 G/DL (ref 6–8.2)
RBC # BLD AUTO: 4.07 M/UL (ref 4.2–5.4)
SODIUM SERPL-SCNC: 142 MMOL/L (ref 135–145)
WBC # BLD AUTO: 5.7 K/UL (ref 4.8–10.8)

## 2022-08-07 PROCEDURE — 160036 HCHG PACU - EA ADDL 30 MINS PHASE I: Performed by: SURGERY

## 2022-08-07 PROCEDURE — G0475 HIV COMBINATION ASSAY: HCPCS

## 2022-08-07 PROCEDURE — 83690 ASSAY OF LIPASE: CPT

## 2022-08-07 PROCEDURE — 99254 IP/OBS CNSLTJ NEW/EST MOD 60: CPT | Mod: 57 | Performed by: SURGERY

## 2022-08-07 PROCEDURE — 85025 COMPLETE CBC W/AUTO DIFF WBC: CPT

## 2022-08-07 PROCEDURE — 160048 HCHG OR STATISTICAL LEVEL 1-5: Performed by: SURGERY

## 2022-08-07 PROCEDURE — 160009 HCHG ANES TIME/MIN: Performed by: SURGERY

## 2022-08-07 PROCEDURE — 700101 HCHG RX REV CODE 250: Performed by: ANESTHESIOLOGY

## 2022-08-07 PROCEDURE — 0FT44ZZ RESECTION OF GALLBLADDER, PERCUTANEOUS ENDOSCOPIC APPROACH: ICD-10-PCS | Performed by: SURGERY

## 2022-08-07 PROCEDURE — 700111 HCHG RX REV CODE 636 W/ 250 OVERRIDE (IP): Performed by: SURGERY

## 2022-08-07 PROCEDURE — 160029 HCHG SURGERY MINUTES - 1ST 30 MINS LEVEL 4: Performed by: SURGERY

## 2022-08-07 PROCEDURE — 700101 HCHG RX REV CODE 250: Performed by: INTERNAL MEDICINE

## 2022-08-07 PROCEDURE — 160041 HCHG SURGERY MINUTES - EA ADDL 1 MIN LEVEL 4: Performed by: SURGERY

## 2022-08-07 PROCEDURE — 80053 COMPREHEN METABOLIC PANEL: CPT

## 2022-08-07 PROCEDURE — 770001 HCHG ROOM/CARE - MED/SURG/GYN PRIV*

## 2022-08-07 PROCEDURE — 160002 HCHG RECOVERY MINUTES (STAT): Performed by: SURGERY

## 2022-08-07 PROCEDURE — 99233 SBSQ HOSP IP/OBS HIGH 50: CPT | Mod: GC | Performed by: INTERNAL MEDICINE

## 2022-08-07 PROCEDURE — A9270 NON-COVERED ITEM OR SERVICE: HCPCS | Performed by: ANESTHESIOLOGY

## 2022-08-07 PROCEDURE — A9270 NON-COVERED ITEM OR SERVICE: HCPCS | Performed by: INTERNAL MEDICINE

## 2022-08-07 PROCEDURE — 00790 ANES IPER UPR ABD NOS: CPT | Performed by: ANESTHESIOLOGY

## 2022-08-07 PROCEDURE — 700102 HCHG RX REV CODE 250 W/ 637 OVERRIDE(OP): Performed by: ANESTHESIOLOGY

## 2022-08-07 PROCEDURE — 160035 HCHG PACU - 1ST 60 MINS PHASE I: Performed by: SURGERY

## 2022-08-07 PROCEDURE — 700102 HCHG RX REV CODE 250 W/ 637 OVERRIDE(OP): Performed by: INTERNAL MEDICINE

## 2022-08-07 PROCEDURE — 700111 HCHG RX REV CODE 636 W/ 250 OVERRIDE (IP): Performed by: INTERNAL MEDICINE

## 2022-08-07 PROCEDURE — 47562 LAPAROSCOPIC CHOLECYSTECTOMY: CPT | Performed by: SURGERY

## 2022-08-07 PROCEDURE — 700111 HCHG RX REV CODE 636 W/ 250 OVERRIDE (IP): Performed by: ANESTHESIOLOGY

## 2022-08-07 PROCEDURE — 700105 HCHG RX REV CODE 258: Performed by: ANESTHESIOLOGY

## 2022-08-07 PROCEDURE — 88304 TISSUE EXAM BY PATHOLOGIST: CPT

## 2022-08-07 RX ORDER — SODIUM CHLORIDE, SODIUM LACTATE, POTASSIUM CHLORIDE, CALCIUM CHLORIDE 600; 310; 30; 20 MG/100ML; MG/100ML; MG/100ML; MG/100ML
INJECTION, SOLUTION INTRAVENOUS
Status: DISCONTINUED | OUTPATIENT
Start: 2022-08-07 | End: 2022-08-07 | Stop reason: HOSPADM

## 2022-08-07 RX ORDER — MEPERIDINE HYDROCHLORIDE 25 MG/ML
6.25 INJECTION INTRAMUSCULAR; INTRAVENOUS; SUBCUTANEOUS
Status: DISCONTINUED | OUTPATIENT
Start: 2022-08-07 | End: 2022-08-07 | Stop reason: HOSPADM

## 2022-08-07 RX ORDER — BUPIVACAINE HYDROCHLORIDE 2.5 MG/ML
INJECTION, SOLUTION EPIDURAL; INFILTRATION; INTRACAUDAL
Status: DISCONTINUED | OUTPATIENT
Start: 2022-08-07 | End: 2022-08-07 | Stop reason: HOSPADM

## 2022-08-07 RX ORDER — HYDROMORPHONE HYDROCHLORIDE 1 MG/ML
0.2 INJECTION, SOLUTION INTRAMUSCULAR; INTRAVENOUS; SUBCUTANEOUS
Status: DISCONTINUED | OUTPATIENT
Start: 2022-08-07 | End: 2022-08-07 | Stop reason: HOSPADM

## 2022-08-07 RX ORDER — KETOROLAC TROMETHAMINE 30 MG/ML
INJECTION, SOLUTION INTRAMUSCULAR; INTRAVENOUS PRN
Status: DISCONTINUED | OUTPATIENT
Start: 2022-08-07 | End: 2022-08-07 | Stop reason: SURG

## 2022-08-07 RX ORDER — OXYCODONE HCL 5 MG/5 ML
10 SOLUTION, ORAL ORAL
Status: COMPLETED | OUTPATIENT
Start: 2022-08-07 | End: 2022-08-07

## 2022-08-07 RX ORDER — HYDRALAZINE HYDROCHLORIDE 20 MG/ML
5 INJECTION INTRAMUSCULAR; INTRAVENOUS
Status: DISCONTINUED | OUTPATIENT
Start: 2022-08-07 | End: 2022-08-07 | Stop reason: HOSPADM

## 2022-08-07 RX ORDER — HYDROMORPHONE HYDROCHLORIDE 1 MG/ML
0.1 INJECTION, SOLUTION INTRAMUSCULAR; INTRAVENOUS; SUBCUTANEOUS
Status: DISCONTINUED | OUTPATIENT
Start: 2022-08-07 | End: 2022-08-07 | Stop reason: HOSPADM

## 2022-08-07 RX ORDER — ONDANSETRON 2 MG/ML
INJECTION INTRAMUSCULAR; INTRAVENOUS PRN
Status: DISCONTINUED | OUTPATIENT
Start: 2022-08-07 | End: 2022-08-07 | Stop reason: SURG

## 2022-08-07 RX ORDER — KETOROLAC TROMETHAMINE 30 MG/ML
30 INJECTION, SOLUTION INTRAMUSCULAR; INTRAVENOUS EVERY 6 HOURS
Status: DISCONTINUED | OUTPATIENT
Start: 2022-08-07 | End: 2022-08-08 | Stop reason: HOSPADM

## 2022-08-07 RX ORDER — SODIUM CHLORIDE, SODIUM LACTATE, POTASSIUM CHLORIDE, CALCIUM CHLORIDE 600; 310; 30; 20 MG/100ML; MG/100ML; MG/100ML; MG/100ML
INJECTION, SOLUTION INTRAVENOUS CONTINUOUS
Status: DISCONTINUED | OUTPATIENT
Start: 2022-08-07 | End: 2022-08-07 | Stop reason: HOSPADM

## 2022-08-07 RX ORDER — DIPHENHYDRAMINE HYDROCHLORIDE 50 MG/ML
12.5 INJECTION INTRAMUSCULAR; INTRAVENOUS
Status: DISCONTINUED | OUTPATIENT
Start: 2022-08-07 | End: 2022-08-07 | Stop reason: HOSPADM

## 2022-08-07 RX ORDER — LIDOCAINE HYDROCHLORIDE 20 MG/ML
INJECTION, SOLUTION EPIDURAL; INFILTRATION; INTRACAUDAL; PERINEURAL PRN
Status: DISCONTINUED | OUTPATIENT
Start: 2022-08-07 | End: 2022-08-07 | Stop reason: SURG

## 2022-08-07 RX ORDER — ROCURONIUM BROMIDE 10 MG/ML
INJECTION, SOLUTION INTRAVENOUS PRN
Status: DISCONTINUED | OUTPATIENT
Start: 2022-08-07 | End: 2022-08-07 | Stop reason: SURG

## 2022-08-07 RX ORDER — CEFOTETAN DISODIUM 2 G/20ML
INJECTION, POWDER, FOR SOLUTION INTRAMUSCULAR; INTRAVENOUS PRN
Status: DISCONTINUED | OUTPATIENT
Start: 2022-08-07 | End: 2022-08-07 | Stop reason: SURG

## 2022-08-07 RX ORDER — HALOPERIDOL 5 MG/ML
1 INJECTION INTRAMUSCULAR
Status: DISCONTINUED | OUTPATIENT
Start: 2022-08-07 | End: 2022-08-07 | Stop reason: HOSPADM

## 2022-08-07 RX ORDER — DEXAMETHASONE SODIUM PHOSPHATE 4 MG/ML
INJECTION, SOLUTION INTRA-ARTICULAR; INTRALESIONAL; INTRAMUSCULAR; INTRAVENOUS; SOFT TISSUE PRN
Status: DISCONTINUED | OUTPATIENT
Start: 2022-08-07 | End: 2022-08-07 | Stop reason: SURG

## 2022-08-07 RX ORDER — METOPROLOL TARTRATE 1 MG/ML
1 INJECTION, SOLUTION INTRAVENOUS
Status: DISCONTINUED | OUTPATIENT
Start: 2022-08-07 | End: 2022-08-07 | Stop reason: HOSPADM

## 2022-08-07 RX ORDER — OXYCODONE HCL 5 MG/5 ML
5 SOLUTION, ORAL ORAL
Status: COMPLETED | OUTPATIENT
Start: 2022-08-07 | End: 2022-08-07

## 2022-08-07 RX ORDER — MIDAZOLAM HYDROCHLORIDE 1 MG/ML
INJECTION INTRAMUSCULAR; INTRAVENOUS PRN
Status: DISCONTINUED | OUTPATIENT
Start: 2022-08-07 | End: 2022-08-07 | Stop reason: SURG

## 2022-08-07 RX ORDER — HYDROCODONE BITARTRATE AND ACETAMINOPHEN 5; 325 MG/1; MG/1
1 TABLET ORAL EVERY 6 HOURS PRN
Status: DISCONTINUED | OUTPATIENT
Start: 2022-08-07 | End: 2022-08-08 | Stop reason: HOSPADM

## 2022-08-07 RX ORDER — ALBUTEROL SULFATE 2.5 MG/3ML
2.5 SOLUTION RESPIRATORY (INHALATION)
Status: DISCONTINUED | OUTPATIENT
Start: 2022-08-07 | End: 2022-08-07 | Stop reason: HOSPADM

## 2022-08-07 RX ORDER — HYDROMORPHONE HYDROCHLORIDE 1 MG/ML
0.4 INJECTION, SOLUTION INTRAMUSCULAR; INTRAVENOUS; SUBCUTANEOUS
Status: DISCONTINUED | OUTPATIENT
Start: 2022-08-07 | End: 2022-08-07 | Stop reason: HOSPADM

## 2022-08-07 RX ORDER — ONDANSETRON 2 MG/ML
4 INJECTION INTRAMUSCULAR; INTRAVENOUS
Status: DISCONTINUED | OUTPATIENT
Start: 2022-08-07 | End: 2022-08-07 | Stop reason: HOSPADM

## 2022-08-07 RX ADMIN — MIDAZOLAM HYDROCHLORIDE 2 MG: 1 INJECTION, SOLUTION INTRAMUSCULAR; INTRAVENOUS at 10:12

## 2022-08-07 RX ADMIN — METRONIDAZOLE 500 MG: 5 INJECTION, SOLUTION INTRAVENOUS at 05:01

## 2022-08-07 RX ADMIN — SODIUM CHLORIDE, POTASSIUM CHLORIDE, SODIUM LACTATE AND CALCIUM CHLORIDE: 600; 310; 30; 20 INJECTION, SOLUTION INTRAVENOUS at 11:55

## 2022-08-07 RX ADMIN — ROCURONIUM BROMIDE 26 MG: 10 INJECTION, SOLUTION INTRAVENOUS at 10:15

## 2022-08-07 RX ADMIN — PROPOFOL 10 MG: 10 INJECTION, EMULSION INTRAVENOUS at 10:38

## 2022-08-07 RX ADMIN — LIDOCAINE HYDROCHLORIDE 50 MG: 20 INJECTION, SOLUTION EPIDURAL; INFILTRATION; INTRACAUDAL at 10:15

## 2022-08-07 RX ADMIN — FENTANYL CITRATE 25 MCG: 50 INJECTION, SOLUTION INTRAMUSCULAR; INTRAVENOUS at 10:13

## 2022-08-07 RX ADMIN — ONDANSETRON 4 MG: 2 INJECTION INTRAMUSCULAR; INTRAVENOUS at 10:34

## 2022-08-07 RX ADMIN — DEXAMETHASONE SODIUM PHOSPHATE 8 MG: 4 INJECTION, SOLUTION INTRA-ARTICULAR; INTRALESIONAL; INTRAMUSCULAR; INTRAVENOUS; SOFT TISSUE at 10:31

## 2022-08-07 RX ADMIN — METRONIDAZOLE 500 MG: 5 INJECTION, SOLUTION INTRAVENOUS at 13:26

## 2022-08-07 RX ADMIN — CEFOTETAN DISODIUM 2 G: 2 INJECTION, POWDER, FOR SOLUTION INTRAMUSCULAR; INTRAVENOUS at 10:21

## 2022-08-07 RX ADMIN — SUGAMMADEX 110 MG: 100 INJECTION, SOLUTION INTRAVENOUS at 10:47

## 2022-08-07 RX ADMIN — METRONIDAZOLE 500 MG: 5 INJECTION, SOLUTION INTRAVENOUS at 20:44

## 2022-08-07 RX ADMIN — LIDOCAINE HYDROCHLORIDE 50 MG: 20 INJECTION, SOLUTION EPIDURAL; INFILTRATION; INTRACAUDAL at 10:47

## 2022-08-07 RX ADMIN — SENNOSIDES AND DOCUSATE SODIUM 2 TABLET: 50; 8.6 TABLET ORAL at 05:01

## 2022-08-07 RX ADMIN — KETOROLAC TROMETHAMINE 30 MG: 30 INJECTION, SOLUTION INTRAMUSCULAR at 23:15

## 2022-08-07 RX ADMIN — CEFTRIAXONE SODIUM 1 G: 10 INJECTION, POWDER, FOR SOLUTION INTRAVENOUS at 05:02

## 2022-08-07 RX ADMIN — SENNOSIDES AND DOCUSATE SODIUM 2 TABLET: 50; 8.6 TABLET ORAL at 17:34

## 2022-08-07 RX ADMIN — KETOROLAC TROMETHAMINE 25 MG: 30 INJECTION, SOLUTION INTRAMUSCULAR at 10:50

## 2022-08-07 RX ADMIN — OXYCODONE HYDROCHLORIDE 5 MG: 5 SOLUTION ORAL at 11:38

## 2022-08-07 RX ADMIN — PROPOFOL 100 MG: 10 INJECTION, EMULSION INTRAVENOUS at 10:15

## 2022-08-07 RX ADMIN — FENTANYL CITRATE 50 MCG: 50 INJECTION, SOLUTION INTRAMUSCULAR; INTRAVENOUS at 10:36

## 2022-08-07 RX ADMIN — FENTANYL CITRATE 50 MCG: 50 INJECTION, SOLUTION INTRAMUSCULAR; INTRAVENOUS at 10:28

## 2022-08-07 RX ADMIN — SODIUM CHLORIDE, POTASSIUM CHLORIDE, SODIUM LACTATE AND CALCIUM CHLORIDE: 600; 310; 30; 20 INJECTION, SOLUTION INTRAVENOUS at 10:09

## 2022-08-07 ASSESSMENT — ENCOUNTER SYMPTOMS
DOUBLE VISION: 0
WEIGHT LOSS: 0
BLURRED VISION: 0
NAUSEA: 0
FEVER: 0
BACK PAIN: 0
SPEECH CHANGE: 0
SHORTNESS OF BREATH: 0
SPUTUM PRODUCTION: 0
CHILLS: 0
NECK PAIN: 0
ABDOMINAL PAIN: 0
PALPITATIONS: 0
SENSORY CHANGE: 0
VOMITING: 0
DIARRHEA: 0

## 2022-08-07 ASSESSMENT — PAIN DESCRIPTION - PAIN TYPE: TYPE: SURGICAL PAIN

## 2022-08-07 NOTE — PROGRESS NOTES
Patient alert and oriented x4. No complaint of pain currently. Vital signs are stable. Patient education regarding safety and fall prevention after procedure. No other complaint from patient.

## 2022-08-07 NOTE — ANESTHESIA TIME REPORT
Anesthesia Start and Stop Event Times     Date Time Event    8/7/2022 0946 Ready for Procedure     1009 Anesthesia Start     1106 Anesthesia Stop        Responsible Staff  08/07/22    Name Role Begin End    Aj Eddy M.D. Anesth 1009 1106        Overtime Reason:  no overtime (within assigned shift)    Comments:

## 2022-08-07 NOTE — OP REPORT
DATE OF SERVICE:  08/07/2022     PREOPERATIVE DIAGNOSES:   Choledocholithiasis and chronic cholecystitis.     POSTOPERATIVE DIAGNOSES:  Choledocholithiasis and chronic cholecystitis.     PROCEDURE:  Laparoscopic cholecystectomy.     SURGEON:  Katina Yates MD     ASSISTANT:  JERROD Giraldo     ANESTHESIA:  General endotracheal.     ANESTHESIOLOGIST:  Aj Eddy MD     INDICATIONS:   The patient is a 62-year-old female with a several-month   history of abdominal pain and ultimately culminated in 2 days ago, presenting   with severe abdominal pain  with the findings of choledocholithiasis.  She had   an ERCP and stent placed yesterday.  She is being brought to the operating   room at this time for laparoscopic cholecystectomy. The indications for a surgical   assistant in this surgery were indicated due to complexity of the procedure. Their role   included aiding in incision, retraction, holding devices including camera for laparoscopic   procedure, and closure of the wound.        FINDINGS:  A single duct and single artery with inflammation consistent with   chronic cholecystitis.     DESCRIPTION OF PROCEDURE:  After the patient was identified and consented, she   was brought to the OR and placed in supine position.  The patient underwent   general endotracheal anesthetic.  The abdomen was prepped and draped in   standard sterile fashion.  Periumbilical area was anesthetized with 0.25%   Marcaine, 1 cm incision was made.  The abdominal wall was lifted up, Veress   needle was inserted into the abdominal cavity.  After positive drop test,   pneumoperitoneum was obtained, Veress was removed.  A #5 trocar was placed.    Under laparoscopic guidance, a total trocar was placed in the epigastric   position and two 5 mm trocars placed in right subcostal position.  The   gallbladder was lifted up, demonstrates triangle of Calot.  After adhesions   were taken down, the duct and adjacent soft tissues were doubly  clipped and   transected.  The gallbladder was excised from the liver bed using   electrocautery.  It was not in the process of excision, it was brought through   the epigastric port.  Liver bed was irrigated and hemostasis occurred.  Port   sites were visualized.  All port sites were closed with 4-0 Vicryls.  Op-Site   dressing placed on the wounds.  The patient was extubated and taken to   recovery room in stable condition.  All sponge and needle counts were correct.        ______________________________  PILO SNYDER MD    JANICE/GUSTABO/ROBYN    DD:  08/07/2022 10:50  DT:  08/07/2022 11:55    Job#:  [id number]    CC:Aj Eddy MD(User)

## 2022-08-07 NOTE — CONSULTS
CHIEF COMPLAINT: choledocholithiasis     HISTORY OF PRESENT ILLNESS: The patient is a 62 year-old  woman who presents to the Emergency Department a 1 day history of severe epigastric abdominal pain. The pain is associated with nausea and vomiting. She was diagnosed with choledocholithiasis.  She underwent ERCP yesterday. Pain is gone but needs lap marisela.     PAST MEDICAL HISTORY:  has no past medical history on file.    PAST SURGICAL HISTORY:  has no past surgical history on file.    ALLERGIES: No Known Allergies    CURRENT MEDICATIONS:    Home Medications     Reviewed by Susan Mars R.N. (Registered Nurse) on 08/04/22 at 1912  Med List Status: Complete   Medication Last Dose Status   ibuprofen (MOTRIN) 200 MG Tab 8/3/2022 Active   NON SPECIFIED 8/4/2022 Active                FAMILY HISTORY: family history includes Diabetes in her mother.    SOCIAL HISTORY:  reports that she has never smoked. She has never used smokeless tobacco. She reports that she does not drink alcohol and does not use drugs.    REVIEW OF SYSTEMS: Comprehensive review of systems is negative with the exception of the aforementioned HPI, PMH, and PSH bullets in accordance with CMS guidelines.    PHYSICAL EXAMINATION:      Constitutional:     Vital Signs: /60   Pulse (!) 56   Temp 36.3 °C (97.3 °F) (Temporal)   Resp 16   Ht 1.524 m (5')   Wt 54.8 kg (120 lb 13 oz)   SpO2 95%    General Appearance: appears stated age.  HEENT: The pupils are equal, round, and reactive to light bilaterally  The sclera are  anicteric. Nares and oropharynx are clear.   Neck: Supple   Respiratory:   Inspection: Unlabored respirations, no intercostal retractions, paradoxical motion, or accessory muscle use.  Cardiovascular:   Inspection: The skin is warm.  Abdomen:  Inspection: Abdominal inspection reveals no scars.   Palpation: Palpation is remarkable for no significant tenderness, guarding, or peritoneal findings    Extremities:   Examination  of the upper and lower extremities demonstrates no cyanosis edema or clubbing.  Neurologic:     No focal deficits noted.    LABORATORY VALUES:   Recent Labs     08/05/22  0236 08/06/22  0609 08/07/22  0650   WBC 5.8 4.7* 5.7   RBC 4.38 4.03* 4.07*   HEMOGLOBIN 12.9 12.0 12.0   HEMATOCRIT 39.3 35.6* 35.7*   MCV 89.7 88.3 87.7   MCH 29.5 29.8 29.5   MCHC 32.8* 33.7 33.6   RDW 49.4 47.8 48.1   PLATELETCT 195 194 221   MPV 10.1 9.6 9.9     Recent Labs     08/05/22  0236 08/06/22  0609 08/07/22  0650   SODIUM 141 139 142   POTASSIUM 3.4* 3.7 3.6   CHLORIDE 107 109 108   CO2 19* 20 22   GLUCOSE 87 96 106*   BUN 15 12 12   CREATININE 0.70 0.63 0.61   CALCIUM 8.5 8.3* 8.5     Recent Labs     08/05/22  0236 08/06/22  0609 08/07/22  0650   ASTSGOT 356* 178* 91*   ALTSGPT 535* 343* 254*   TBILIRUBIN 3.8* 1.2 0.7   ALKPHOSPHAT 284* 249* 244*   GLOBULIN 2.9 2.8 2.8            IMAGING:   DX-PORTABLE FLUORO > 1 HOUR   Final Result      ERCP images as described      [id number]/O   Final Result      1.  Positive for choledocholithiasis      2.  Associated biliary dilation with common bile duct measuring 10 mm      3.  No gallstones are identified. The gallbladder is partially contracted limiting the sensitivity for gallstones.      US-RUQ   Final Result      1.  Gallbladder wall thickening, potentially in part due to partially contracted state although cholecystitis is not excluded.  No gallstone demonstrated.   2.  No biliary dilation.   3.  Small RIGHT kidney cyst for which no further evaluation is necessary.          ASSESSMENT AND PLAN:     * Choledocholithiasis- (present on admission)  Assessment & Plan  Presented with choledocholithiasis  8/7 ERCP  Plan lap marisela        The patient has Grade II (moderate) cholelithiasis and choledocholithiasis. Plan: laparoscopic cholecystectomy.    The patient will be taken to the operating room for laparoscopic cholecystectomy. The surgical conduct was discussed in detail. Potential  complications including, but not limited to, infection, bleeding, damage to adjacent structures, bile duct injury, need to convert to an open procedure, and anesthetic complications were discussed. Operative consent signed.      ____________________________________     Katina Yates M.D.    DD: 8/7/2022  8:53 AM

## 2022-08-07 NOTE — ANESTHESIA PROCEDURE NOTES
Airway    Date/Time: 8/7/2022 10:17 AM  Performed by: Aj Eddy M.D.  Authorized by: Aj Eddy M.D.     Location:  OR  Urgency:  Elective  Indications for Airway Management:  Anesthesia      Spontaneous Ventilation: absent    Sedation Level:  Deep  Preoxygenated: Yes    Patient Position:  Sniffing  Final Airway Type:  Endotracheal airway  Final Endotracheal Airway:  ETT  Cuffed: Yes    Technique Used for Successful ETT Placement:  Direct laryngoscopy    Insertion Site:  Oral  Blade Type:  Thornton  Laryngoscope Blade/Videolaryngoscope Blade Size:  2  ETT Size (mm):  7.0  Measured from:  Teeth  ETT to Teeth (cm):  20  Placement Verified by: auscultation and capnometry    Cormack-Lehane Classification:  Grade I - full view of glottis  Number of Attempts at Approach:  1

## 2022-08-07 NOTE — OR NURSING
Report called to Miranda CLAROS. Plan of care discussed. Patient denies pain or nausea.  used to update patient on plan of care and plans to transfer back to hospital room. Questions answered. Patient states all needs are met at this time.

## 2022-08-07 NOTE — CARE PLAN
The patient is Stable - Low risk of patient condition declining or worsening    Shift Goals  Clinical Goals: safety and monitor  Patient Goals: discharge  Family Goals: unique    Progress made toward(s) clinical / shift goals:    Pt remained free from injury, no complains of pain.  Provided bedside nursing care, monitor patient. Provided rest and comfort.      Problem: Knowledge Deficit - Standard  Goal: Patient and family/care givers will demonstrate understanding of plan of care, disease process/condition, diagnostic tests and medications  Outcome: Progressing

## 2022-08-07 NOTE — PROGRESS NOTES
[facility] Internal Medicine Daily Progress Note    Date of Service  8/7/2022    R Team: R IM Purple Team   Attending: Caridad Oakley M.d.  Senior Resident: Dr. Song  Intern:  Dr. Bridges  Contact Number: [phone]    Chief Complaint  Sima Plaza is a 62 y.o. female admitted 8/4/2022 with abdominal pain    Hospital Course  62-year-old female with no significant PMH presenting on 8/4 with abdominal pain.  Has had epigastric pain for over 2 months, aggravated by eating, comes and goes, and associated with N/V.  Also developed low-grade fever and chills, denied cardiorespiratory symptoms.  Admission labs WBC 12, , , 6.4 bilirubin.  U/S demonstrated gallbladder wall thickening, no stones, and no bile duct dilatation.  Started on IV fluids with ceftriaxone and Flagyl.  GI was consulted. MRCP positive for CBD stone, with ERCP performed and stent placed.      Interval Problem Update  Patient seen on 8/7 morning, recovering without complications.  She states she is not feeling any pain.  She was explained what the ERCP and stent placement yesterday entailed, and the likelihood that she would need an additional surgery (cholecystectomy).  She expressed understanding and gratitude for answering her questions.  Surgery was consulted and patient headed to the OR for laparoscopic cholecystectomy.  Currently still on C3 and Flagyl.    I have discussed this patient's plan of care and discharge plan at IDT rounds today with Case Management, Nursing, Nursing leadership, and other members of the IDT team.    Consultants/Specialty  GI    Code Status  Full Code    Disposition  Patient is not medically cleared for discharge.   Anticipate discharge to to home with close outpatient follow-up.  I have placed the appropriate orders for post-discharge needs.    Review of Systems  Review of Systems   Constitutional: Negative for chills, fever, malaise/fatigue and weight loss.   HENT: Negative for ear discharge and ear pain.     Eyes: Negative for blurred vision and double vision.   Respiratory: Negative for sputum production and shortness of breath.    Cardiovascular: Negative for chest pain and palpitations.   Gastrointestinal: Negative for abdominal pain, diarrhea, nausea and vomiting.   Musculoskeletal: Negative for back pain and neck pain.   Neurological: Negative for sensory change and speech change.        Physical Exam  Temp:  [36 °C (96.8 °F)-37.2 °C (98.9 °F)] 36.6 °C (97.9 °F)  Pulse:  [54-88] 63  Resp:  [14-20] 16  BP: (103-169)/(56-80) 136/64  SpO2:  [90 %-97 %] 93 %    Physical Exam  Constitutional:       General: She is not in acute distress.     Appearance: She is not toxic-appearing.   HENT:      Head: Normocephalic and atraumatic.      Right Ear: External ear normal.      Left Ear: External ear normal.      Nose: Nose normal.      Mouth/Throat:      Pharynx: Oropharynx is clear.   Eyes:      Extraocular Movements: Extraocular movements intact.      Pupils: Pupils are equal, round, and reactive to light.   Cardiovascular:      Rate and Rhythm: Normal rate and regular rhythm.   Pulmonary:      Effort: Pulmonary effort is normal.      Breath sounds: Normal breath sounds.   Abdominal:      General: Abdomen is flat.      Palpations: Abdomen is soft.   Musculoskeletal:      Cervical back: Normal range of motion.      Right lower leg: No edema.      Left lower leg: No edema.   Neurological:      General: No focal deficit present.      Mental Status: She is alert and oriented to person, place, and time.   Psychiatric:         Mood and Affect: Mood normal.         Behavior: Behavior normal.         Thought Content: Thought content normal.         Fluids    Intake/Output Summary (Last 24 hours) at 8/7/2022 1409  Last data filed at 8/7/2022 1106  Gross per 24 hour   Intake 500 ml   Output 15 ml   Net 485 ml       Laboratory  Recent Labs     08/05/22  0236 08/06/22  0609 08/07/22  0650   WBC 5.8 4.7* 5.7   RBC 4.38 4.03* 4.07*    HEMOGLOBIN 12.9 12.0 12.0   HEMATOCRIT 39.3 35.6* 35.7*   MCV 89.7 88.3 87.7   MCH 29.5 29.8 29.5   MCHC 32.8* 33.7 33.6   RDW 49.4 47.8 48.1   PLATELETCT 195 194 221   MPV 10.1 9.6 9.9     Recent Labs     08/05/22  0236 08/06/22  0609 08/07/22  0650   SODIUM 141 139 142   POTASSIUM 3.4* 3.7 3.6   CHLORIDE 107 109 108   CO2 19* 20 22   GLUCOSE 87 96 106*   BUN 15 12 12   CREATININE 0.70 0.63 0.61   CALCIUM 8.5 8.3* 8.5                   Imaging  DX-PORTABLE FLUORO > 1 HOUR   Final Result      ERCP images as described      [id number]   Final Result      1.  Positive for choledocholithiasis      2.  Associated biliary dilation with common bile duct measuring 10 mm      3.  No gallstones are identified. The gallbladder is partially contracted limiting the sensitivity for gallstones.      US-RUQ   Final Result      1.  Gallbladder wall thickening, potentially in part due to partially contracted state although cholecystitis is not excluded.  No gallstone demonstrated.   2.  No biliary dilation.   3.  Small RIGHT kidney cyst for which no further evaluation is necessary.             Assessment/Plan  Problem Representation:    * Choledocholithiasis- (present on admission)  Assessment & Plan    Initially presented with ABD pain in associated N/V.  Mild leukocytosis with elevated LFTs, bilirubin and alk phos.  U/S demonstrated gallbladder wall thickening, however no stones or bile duct dilation.  MRCP consistent with choledocholithiasis with CBD dilation measuring 10 mm.    -ERCP with stent placement on 8/6  -Patient taken to the OR for laparoscopic cholecystectomy on 8/7 to prevent future recurrence    Lymphopenia  Assessment & Plan  Absolute lymphocyte count 0.86 on 8/6, values less than 1 can be suggestive of low CD4 count    -Recent labs demonstrate normalization  -HIV screen pending    Elevated bilirubin- (present on admission)  Assessment & Plan  Likely secondary to obstruction.  Associated elevated alk phos.   Ultrasound did not demonstrate stones or bile duct dilation.  MRCP positive for CBD stone.  ERCP with stent placement on 8/6.    -Recent labs demonstrate resolution  -Patient to receive laparoscopic cholecystectomy today  -Monitor through labs    Leukocytosis- (present on admission)  Assessment & Plan  Likely related to choledocholithiasis.  Mild leukocytosis at 12 initially.    -Recent labs demonstrate resolution  -Continue IV ABX ceftriaxone and Flagyl  -Monitor through labs    Elevated liver enzymes- (present on admission)  Assessment & Plan  Likely secondary to known choledocholithiasis seen on MRCP.  Associated elevated bilirubin and alk phos.  Abdominal pain initially present on admission.  Viral hepatitis panel negative.  No history of alcohol abuse.    -Repeat LFTs downtrending, as well as bilirubin and alk phos  -Continuing Flagyl and C3  -Will continue to trend with labs       VTE prophylaxis: pharmacologic prophylaxis contraindicated due to Cholecystectomy performed on 8/7    I have performed a physical exam and reviewed and updated ROS and Plan today (8/7/2022). In review of yesterday's note (8/6/2022), there are no changes except as documented above.

## 2022-08-07 NOTE — ANESTHESIA PREPROCEDURE EVALUATION
Case: [id number] Date/Time: 08/07/22 0930    Procedure: CHOLECYSTECTOMY, LAPAROSCOPIC    Location: [facility] OR 09 / SURGERY [facility]    Surgeons: Katina Yates M.D.      Had ERCP yesterday under anesthesia  Increased LFTs and Lipase from gallstones  Relevant Problems   No relevant active problems       Physical Exam    Airway   Mallampati: II  TM distance: >3 FB  Neck ROM: full       Cardiovascular - normal exam  Rhythm: regular  Rate: normal  (-) murmur     Dental - normal exam           Pulmonary - normal exam  Breath sounds clear to auscultation     Abdominal    Neurological - normal exam         Other findings: Uneventful intubation yesterday            Anesthesia Plan    ASA 2       Plan - general       Airway plan will be ETT          Induction: intravenous    Postoperative Plan: Postoperative administration of opioids is intended.    Pertinent diagnostic labs and testing reviewed    Informed Consent:    Anesthetic plan and risks discussed with patient.    Use of blood products discussed with: patient whom consented to blood products.

## 2022-08-07 NOTE — ANESTHESIA POSTPROCEDURE EVALUATION
Patient: Sima Plaza    Procedure Summary     Date: 08/07/22 Room / Location: [facility] / SURGERY [facility]    Anesthesia Start: 1009 Anesthesia Stop: 1106    Procedure: CHOLECYSTECTOMY, LAPAROSCOPIC (N/A Abdomen) Diagnosis: (choledocholithiasis)    Surgeons: Katina Yates M.D. Responsible Provider: Aj Eddy M.D.    Anesthesia Type: general ASA Status: 2          Final Anesthesia Type: general  Last vitals  BP   Blood Pressure: (!) 151/75    Temp   37.2 °C (98.9 °F)    Pulse   (!) 58   Resp   14    SpO2   97 %      Anesthesia Post Evaluation    Patient location during evaluation: PACU  Patient participation: complete - patient participated  Level of consciousness: awake and alert    Airway patency: patent  Anesthetic complications: no  Cardiovascular status: hemodynamically stable  Respiratory status: acceptable  Hydration status: euvolemic    PONV: none          No complications documented.     Nurse Pain Score: 0 (NPRS)

## 2022-08-07 NOTE — OR NURSING
utilized. Patient updated on plan of care. Questions answered. Patient reports no one to call to provide update. Patient reports minimal abdominal pain. Denies nausea. Medicated per MAR. Patient states all needs are currently met.

## 2022-08-07 NOTE — CARE PLAN
The patient is Stable - Low risk of patient condition declining or worsening    Shift Goals   Clinical Goals: safety  Patient Goals: discharge  Family Goals: unique    Progress made toward(s) clinical / shift goals:  patient calls appropriately     Patient is not progressing towards the following goals:

## 2022-08-08 ENCOUNTER — PHARMACY VISIT (OUTPATIENT)
Dept: PHARMACY | Facility: MEDICAL CENTER | Age: 62
End: 2022-08-08
Payer: COMMERCIAL

## 2022-08-08 VITALS
OXYGEN SATURATION: 93 % | SYSTOLIC BLOOD PRESSURE: 125 MMHG | RESPIRATION RATE: 18 BRPM | WEIGHT: 120.81 LBS | BODY MASS INDEX: 23.72 KG/M2 | TEMPERATURE: 97.7 F | DIASTOLIC BLOOD PRESSURE: 73 MMHG | HEART RATE: 81 BPM | HEIGHT: 60 IN

## 2022-08-08 PROBLEM — D72.810 LYMPHOPENIA: Status: RESOLVED | Noted: 2022-08-06 | Resolved: 2022-08-08

## 2022-08-08 PROBLEM — K80.50 CHOLEDOCHOLITHIASIS: Status: RESOLVED | Noted: 2022-08-04 | Resolved: 2022-08-08

## 2022-08-08 PROBLEM — D72.829 LEUKOCYTOSIS: Status: RESOLVED | Noted: 2022-08-04 | Resolved: 2022-08-08

## 2022-08-08 PROBLEM — R17 ELEVATED BILIRUBIN: Status: RESOLVED | Noted: 2022-08-04 | Resolved: 2022-08-08

## 2022-08-08 LAB
ALBUMIN SERPL BCP-MCNC: 2.9 G/DL (ref 3.2–4.9)
ALBUMIN/GLOB SERPL: 1.2 G/DL
ALP SERPL-CCNC: 227 U/L (ref 30–99)
ALT SERPL-CCNC: 202 U/L (ref 2–50)
ANION GAP SERPL CALC-SCNC: 10 MMOL/L (ref 7–16)
AST SERPL-CCNC: 122 U/L (ref 12–45)
BASOPHILS # BLD AUTO: 0.2 % (ref 0–1.8)
BASOPHILS # BLD: 0.01 K/UL (ref 0–0.12)
BILIRUB SERPL-MCNC: 0.7 MG/DL (ref 0.1–1.5)
BUN SERPL-MCNC: 13 MG/DL (ref 8–22)
CALCIUM SERPL-MCNC: 8.1 MG/DL (ref 8.5–10.5)
CHLORIDE SERPL-SCNC: 107 MMOL/L (ref 96–112)
CO2 SERPL-SCNC: 25 MMOL/L (ref 20–33)
CREAT SERPL-MCNC: 0.69 MG/DL (ref 0.5–1.4)
EOSINOPHIL # BLD AUTO: 0.01 K/UL (ref 0–0.51)
EOSINOPHIL NFR BLD: 0.2 % (ref 0–6.9)
ERYTHROCYTE [DISTWIDTH] IN BLOOD BY AUTOMATED COUNT: 48.5 FL (ref 35.9–50)
GFR SERPLBLD CREATININE-BSD FMLA CKD-EPI: 97 ML/MIN/1.73 M 2
GLOBULIN SER CALC-MCNC: 2.5 G/DL (ref 1.9–3.5)
GLUCOSE SERPL-MCNC: 103 MG/DL (ref 65–99)
HCT VFR BLD AUTO: 34.5 % (ref 37–47)
HGB BLD-MCNC: 11.4 G/DL (ref 12–16)
IMM GRANULOCYTES # BLD AUTO: 0.04 K/UL (ref 0–0.11)
IMM GRANULOCYTES NFR BLD AUTO: 0.6 % (ref 0–0.9)
LYMPHOCYTES # BLD AUTO: 1.48 K/UL (ref 1–4.8)
LYMPHOCYTES NFR BLD: 22.5 % (ref 22–41)
MAGNESIUM SERPL-MCNC: 1.8 MG/DL (ref 1.5–2.5)
MCH RBC QN AUTO: 29.6 PG (ref 27–33)
MCHC RBC AUTO-ENTMCNC: 33 G/DL (ref 33.6–35)
MCV RBC AUTO: 89.6 FL (ref 81.4–97.8)
MONOCYTES # BLD AUTO: 0.44 K/UL (ref 0–0.85)
MONOCYTES NFR BLD AUTO: 6.7 % (ref 0–13.4)
NEUTROPHILS # BLD AUTO: 4.59 K/UL (ref 2–7.15)
NEUTROPHILS NFR BLD: 69.8 % (ref 44–72)
NRBC # BLD AUTO: 0 K/UL
NRBC BLD-RTO: 0 /100 WBC
PATHOLOGY CONSULT NOTE: NORMAL
PHOSPHATE SERPL-MCNC: 3.4 MG/DL (ref 2.5–4.5)
PLATELET # BLD AUTO: 217 K/UL (ref 164–446)
PMV BLD AUTO: 9.8 FL (ref 9–12.9)
POTASSIUM SERPL-SCNC: 3.4 MMOL/L (ref 3.6–5.5)
PROT SERPL-MCNC: 5.4 G/DL (ref 6–8.2)
RBC # BLD AUTO: 3.85 M/UL (ref 4.2–5.4)
SODIUM SERPL-SCNC: 142 MMOL/L (ref 135–145)
WBC # BLD AUTO: 6.6 K/UL (ref 4.8–10.8)

## 2022-08-08 PROCEDURE — 85025 COMPLETE CBC W/AUTO DIFF WBC: CPT

## 2022-08-08 PROCEDURE — RXMED WILLOW AMBULATORY MEDICATION CHARGE

## 2022-08-08 PROCEDURE — 700111 HCHG RX REV CODE 636 W/ 250 OVERRIDE (IP): Performed by: SURGERY

## 2022-08-08 PROCEDURE — 83735 ASSAY OF MAGNESIUM: CPT

## 2022-08-08 PROCEDURE — 99239 HOSP IP/OBS DSCHRG MGMT >30: CPT | Mod: GC | Performed by: INTERNAL MEDICINE

## 2022-08-08 PROCEDURE — 700111 HCHG RX REV CODE 636 W/ 250 OVERRIDE (IP): Performed by: STUDENT IN AN ORGANIZED HEALTH CARE EDUCATION/TRAINING PROGRAM

## 2022-08-08 PROCEDURE — 700101 HCHG RX REV CODE 250: Performed by: STUDENT IN AN ORGANIZED HEALTH CARE EDUCATION/TRAINING PROGRAM

## 2022-08-08 PROCEDURE — 700101 HCHG RX REV CODE 250: Performed by: INTERNAL MEDICINE

## 2022-08-08 PROCEDURE — 80053 COMPREHEN METABOLIC PANEL: CPT

## 2022-08-08 PROCEDURE — A9270 NON-COVERED ITEM OR SERVICE: HCPCS | Performed by: INTERNAL MEDICINE

## 2022-08-08 PROCEDURE — 84100 ASSAY OF PHOSPHORUS: CPT

## 2022-08-08 PROCEDURE — 700102 HCHG RX REV CODE 250 W/ 637 OVERRIDE(OP): Performed by: INTERNAL MEDICINE

## 2022-08-08 RX ORDER — CIPROFLOXACIN 500 MG/1
500 TABLET, FILM COATED ORAL 2 TIMES DAILY
Qty: 4 TABLET | Refills: 0 | Status: SHIPPED | OUTPATIENT
Start: 2022-08-08 | End: 2022-08-10

## 2022-08-08 RX ORDER — METRONIDAZOLE 500 MG/1
500 TABLET ORAL EVERY 8 HOURS
Qty: 6 TABLET | Refills: 0 | Status: SHIPPED | OUTPATIENT
Start: 2022-08-08 | End: 2022-08-10

## 2022-08-08 RX ORDER — ACETAMINOPHEN 325 MG/1
650 TABLET ORAL EVERY 6 HOURS PRN
Qty: 30 TABLET | Refills: 0 | COMMUNITY
Start: 2022-08-08

## 2022-08-08 RX ADMIN — KETOROLAC TROMETHAMINE 30 MG: 30 INJECTION, SOLUTION INTRAMUSCULAR at 04:57

## 2022-08-08 RX ADMIN — CEFTRIAXONE SODIUM 1 G: 10 INJECTION, POWDER, FOR SOLUTION INTRAVENOUS at 04:57

## 2022-08-08 RX ADMIN — SENNOSIDES AND DOCUSATE SODIUM 2 TABLET: 50; 8.6 TABLET ORAL at 04:57

## 2022-08-08 RX ADMIN — METRONIDAZOLE 500 MG: 5 INJECTION, SOLUTION INTRAVENOUS at 04:57

## 2022-08-08 ASSESSMENT — PAIN DESCRIPTION - PAIN TYPE: TYPE: SURGICAL PAIN

## 2022-08-08 NOTE — DISCHARGE INSTRUCTIONS
Diet  clears    Discharge Instructions    Discharged to home by car with relative. Discharged via wheelchair, hospital escort: Yes.  Special equipment needed: Not Applicable    Be sure to schedule a follow-up appointment with your primary care doctor or any specialists as instructed.     Discharge Plan:   Diet Plan: Discussed  Activity Level: Discussed  Confirmed Follow up Appointment: Patient to Call and Schedule Appointment  Confirmed Symptoms Management: Discussed  Medication Reconciliation Updated: Yes    I understand that a diet low in cholesterol, fat, and sodium is recommended for good health. Unless I have been given specific instructions below for another diet, I accept this instruction as my diet prescription.   Other diet: clears    Special Instructions:   FOLLOW UP ITEMS POST DISCHARGE  Please continue antibiotics for next 2 days as prescribed.  Limit heavy lifting to no greater than 15 pounds.  Also, please follow-up with primary care physician in 1 week.      -Is this patient being discharged with medication to prevent blood clots?  No    Is patient discharged on Warfarin / Coumadin?   No

## 2022-08-08 NOTE — DISCHARGE PLANNING
Case Management Discharge Planning    Admission Date: 8/4/2022  GMLOS: 2.2  ALOS: 4    6-Clicks ADL Score: 24  6-Clicks Mobility Score:        Anticipated Discharge Dispo:  Home       Action(s) Taken: OTHER   With [facility]  # [id number] Abiodun spoke with patient at bedside.  2 patient identifiers used to confirm patient id.  Patient states she lives in a 1st floor apartment with her friend Montse and her daughter.  She reports they will pick her up when medically cleared and be able to assist her at home where needed.    She reports she does not have a PCP and would like my assistance scheduling a PCP appointment.     Appointment scheduled with at:    [facility]  [address] [address]    For Monday 08/15/2022 at 13::00  Dr Abelardo Kang    Appointment added to AVS.         Escalations Completed: None    Medically Clear: No    Next Steps: Follow for further dcp needs    Barriers to Discharge: Medical clearance    Is the patient up for discharge tomorrow: No

## 2022-08-08 NOTE — CARE PLAN
The patient is Stable - Low risk of patient condition declining or worsening    Shift Goals  Clinical Goals: safety and pain management  Patient Goals: discharge  Family Goals: unique    Progress made toward(s) clinical / shift goals:      Complains of pain but says tolerable and is managed by non pharm method and medication. Bedside nursing care given. Provided comfort and care.    Problem: Pain - Standard  Goal: Alleviation of pain or a reduction in pain to the patient’s comfort goal  Outcome: Progressing

## 2022-08-08 NOTE — PROGRESS NOTES
Patient alert and oriented x4. Ambulating in room. No complaint of pain. Patient education regarding pain management. No complaint from patient.

## 2022-08-08 NOTE — DISCHARGE PLANNING
Meds-to-Beds: Discharge prescription orders listed below delivered to patient's bedside. RN Emil notified. Patient counseled in Persian.      Current Outpatient Medications   Medication Sig Dispense Refill   • metroNIDAZOLE (FLAGYL) 500 MG Tab Take 1 Tablet by mouth every 8 hours for 2 days. 6 Tablet 0   • ciprofloxacin (CIPRO) 500 MG Tab Take 1 Tablet by mouth 2 times a day for 2 days. 4 Tablet 0      Keyana Price, PharmD

## 2022-08-08 NOTE — DISCHARGE SUMMARY
[facility] Internal Medicine Discharge Summary    Attending: Caridad Oakley M.d.  Senior Resident: Dr. Song  Intern:  Dr. Bridges  Contact Number: [phone]    CHIEF COMPLAINT ON ADMISSION  Chief Complaint   Patient presents with   • Sent by MD     Sent from clinic for back and abdominal pain   • Abdominal Pain     Pt reports abdominal pain with eating x2 months   • Back Pain     X2 months       Reason for Admission  Sent by MD      Admission Date  8/4/2022    CODE STATUS  Full Code    HPI & HOSPITAL COURSE  62-year-old female with no significant PMH presenting on 8/4 with abdominal pain.  Has had epigastric pain for over 2 months, aggravated by eating, comes and goes, and associated with N/V.  Also developed low-grade fever and chills, denied cardiorespiratory symptoms.  Admission labs WBC 12, , , 6.4 bilirubin.  U/S demonstrated gallbladder wall thickening, no stones, and no bile duct dilatation.  Started on IV fluids with ceftriaxone and Flagyl.  GI was consulted. MRCP positive for distal CBD stone with CBD dilation, with ERCP performed and stent placed.  Elective cholecystectomy was performed to prevent future recurrence. The patient tolerated a clear liquid diet afterwards and had a bowel movement. Case was dicussed with general surgery and the patient was cleared for discharge with general surgery.    Therefore, she is discharged in good and stable condition to home with close outpatient follow-up.    The patient met 2-midnight criteria for an inpatient stay at the time of discharge.    Discharge Date  8/8/2022    Physical Exam on Day of Discharge    Physical Exam  Constitutional:       General: She is not in acute distress.     Appearance: She is not toxic-appearing.   HENT:      Head: Normocephalic and atraumatic.      Right Ear: External ear normal.      Left Ear: External ear normal.      Nose: Nose normal.      Mouth/Throat:      Pharynx: Oropharynx is clear.   Eyes:      Extraocular Movements:  Extraocular movements intact.      Pupils: Pupils are equal, round, and reactive to light.   Cardiovascular:      Rate and Rhythm: Normal rate and regular rhythm.   Pulmonary:      Effort: Pulmonary effort is normal.      Breath sounds: Normal breath sounds.   Abdominal:      General: Abdomen is flat.      Palpations: Abdomen is soft.   Incision dressings C/D/I  Musculoskeletal:      Cervical back: Normal range of motion.      Right lower leg: No edema.      Left lower leg: No edema.   Neurological:      General: No focal deficit present.      Mental Status: She is alert and oriented to person, place, and time.   Psychiatric:         Mood and Affect: Mood normal.         Behavior: Behavior normal.         Thought Content: Thought content normal.        FOLLOW UP ITEMS POST DISCHARGE  Please continue antibiotics for next 2 days as prescribed.  Limit heavy lifting to no greater than 15 pounds.  Also, please follow-up with primary care physician in 1 week.    DISCHARGE DIAGNOSES  Principal Problem (Resolved):    Choledocholithiasis POA: Yes  Active Problems:    Elevated liver enzymes POA: Yes  Resolved Problems:    Leukocytosis POA: Yes    Elevated bilirubin POA: Yes    Lymphopenia POA: Yes      FOLLOW UP  No future appointments.  Abelardo Kang, P.A.  [address] [facility] [address]  [phone]    Schedule an appointment as soon as possible for a visit in 1 week(s)  Rosana Calvert 15/08/2022      MEDICATIONS ON DISCHARGE     Medication List      START taking these medications      Instructions   acetaminophen 325 MG Tabs  Commonly known as: Tylenol   Take 2 Tablets by mouth every 6 hours as needed for Moderate Pain or Mild Pain.  Dose: 650 mg     ciprofloxacin 500 MG Tabs  Commonly known as: CIPRO   Take 1 Tablet by mouth 2 times a day for 2 days.  Dose: 500 mg     metroNIDAZOLE 500 MG Tabs  Commonly known as: FLAGYL   Take 1 Tablet by mouth every 8 hours for 2 days.  Dose: 500 mg        STOP taking  these medications    ibuprofen 200 MG Tabs  Commonly known as: MOTRIN     NON SPECIFIED            Allergies  No Known Allergies    DIET  Orders Placed This Encounter   Procedures   • Diet Order Diet: Clear Liquid     Standing Status:   Standing     Number of Occurrences:   1     Order Specific Question:   Diet:     Answer:   Clear Liquid [10]       ACTIVITY  As tolerated.  Weight bearing as tolerated    CONSULTATIONS  General Surgery and Gastroenterology    PROCEDURES  ERCP with stent placement-8/6/2022  Laparoscopic Cholecystectomy  -8/7/2022    LABORATORY  Lab Results   Component Value Date    SODIUM 142 08/08/2022    POTASSIUM 3.4 (L) 08/08/2022    CHLORIDE 107 08/08/2022    CO2 25 08/08/2022    GLUCOSE 103 (H) 08/08/2022    BUN 13 08/08/2022    CREATININE 0.69 08/08/2022        Lab Results   Component Value Date    WBC 6.6 08/08/2022    HEMOGLOBIN 11.4 (L) 08/08/2022    HEMATOCRIT 34.5 (L) 08/08/2022    PLATELETCT 217 08/08/2022        Total time of the discharge process exceeds 35 minutes.

## (undated) DEVICE — GLOVE BIOGEL SZ 6.5 SURGICAL PF LTX (50PR/BX 4BX/CA)

## (undated) DEVICE — TOWEL STOP TIMEOUT SAFETY FLAG (40EA/CA)

## (undated) DEVICE — INTRODUCER STENT NAVIFLEX 10FR

## (undated) DEVICE — SET LEADWIRE 5 LEAD BEDSIDE DISPOSABLE ECG (1SET OF 5/EA)

## (undated) DEVICE — SENSOR OXIMETER ADULT SPO2 RD SET (20EA/BX)

## (undated) DEVICE — SET TUBING PNEUMOCLEAR HIGH FLOW SMOKE EVACUATION (10EA/BX)

## (undated) DEVICE — SUCTION INSTRUMENT YANKAUER BULBOUS TIP W/O VENT (50EA/CA)

## (undated) DEVICE — SCISSORS 5MM CVD (6EA/BX)

## (undated) DEVICE — TROCARCANN&SEAL 5X55 ZTHREAD - 12/BX

## (undated) DEVICE — BALLOON RETRIEVAL EXTRACTOR PRO RX   9-12MM

## (undated) DEVICE — SLEEVE, VASO, THIGH, MED

## (undated) DEVICE — DRESSING TRANSPARENT FILM TEGADERM 2.375 X 2.75"  (100EA/BX)"

## (undated) DEVICE — GOWN WARMING STANDARD FLEX - (30/CA)

## (undated) DEVICE — NEEDLE INSFL 120MM 14GA VRRS - (20/BX)

## (undated) DEVICE — TROCAR5X55 KII SHIELDED SYS - (6/BX)

## (undated) DEVICE — PACK LAP CHOLE OR - (2EA/CA)

## (undated) DEVICE — TUBE CONNECT SUCTION CLEAR 120 X 1/4" (50EA/CA)"

## (undated) DEVICE — SUTURE 4-0 VICRYL PLUS FS-2 - 27 INCH (36/BX)

## (undated) DEVICE — SUTURE 0 VICRYL PLUS UR-6 - 27 INCH (36/BX)

## (undated) DEVICE — GLOVE BIOGEL INDICATOR SZ 6.5 SURGICAL PF LTX - (50PR/BX 4BX/CA)

## (undated) DEVICE — ELECTRODE DUAL RETURN W/ CORD - (50/PK)

## (undated) DEVICE — SUTURE GENERAL

## (undated) DEVICE — TROCAR 5X100 BLADED Z-THREAD - KII (6/BX)

## (undated) DEVICE — SET EXTENSION WITH 2 PORTS (48EA/CA) ***PART #2C8610 IS A SUBSTITUTE*****

## (undated) DEVICE — LACTATED RINGERS INJ 1000 ML - (14EA/CA 60CA/PF)

## (undated) DEVICE — TROCAR Z THREAD 11 X 100 - BLADED (6/BX)

## (undated) DEVICE — TUBING CLEARLINK DUO-VENT - C-FLO (48EA/CA)

## (undated) DEVICE — CHLORAPREP 26 ML APPLICATOR - ORANGE TINT(25/CA)

## (undated) DEVICE — BITE BLOCK ADULT 60FR (100EA/CA)

## (undated) DEVICE — SODIUM CHL IRRIGATION 0.9% 1000ML (12EA/CA)

## (undated) DEVICE — SET SUCTION/IRRIGATION WITH DISPOSABLE TIP (6/CA )PART #0250-070-520 IS A SUB

## (undated) DEVICE — TUBE ET 7.0 ORAL UNCUFFED SHERIDAN PERFORMED (10EA/BX)

## (undated) DEVICE — CANNULA W/SEAL 5X100 Z-THRE - ADED KII (12/BX)

## (undated) DEVICE — SPONGE GAUZESTER. 2X2 4-PL - (2/PK 50PK/BX 30BX/CS)

## (undated) DEVICE — CANISTER SUCTION 3000ML MECHANICAL FILTER AUTO SHUTOFF MEDI-VAC NONSTERILE LF DISP  (40EA/CA)

## (undated) DEVICE — ELECTRODE 850 FOAM ADHESIVE - HYDROGEL RADIOTRNSPRNT (50/PK)

## (undated) DEVICE — SPHINCTERTOME TRUETOME 44 20MM PRELOAD JAG 035IN